# Patient Record
Sex: MALE | Race: WHITE | NOT HISPANIC OR LATINO | Employment: UNEMPLOYED | URBAN - NONMETROPOLITAN AREA
[De-identification: names, ages, dates, MRNs, and addresses within clinical notes are randomized per-mention and may not be internally consistent; named-entity substitution may affect disease eponyms.]

---

## 2019-08-09 ENCOUNTER — TRANSCRIBE ORDERS (OUTPATIENT)
Dept: ADMINISTRATIVE | Facility: HOSPITAL | Age: 29
End: 2019-08-09

## 2019-08-09 ENCOUNTER — HOSPITAL ENCOUNTER (OUTPATIENT)
Dept: ULTRASOUND IMAGING | Facility: HOSPITAL | Age: 29
Discharge: HOME | End: 2019-08-09
Admitting: PHYSICIAN ASSISTANT

## 2019-08-09 DIAGNOSIS — N45.1 EPIDIDYMITIS WITHOUT ABSCESS: Primary | ICD-10-CM

## 2019-08-09 DIAGNOSIS — N45.1 EPIDIDYMITIS WITHOUT ABSCESS: ICD-10-CM

## 2019-08-09 PROCEDURE — 76870 US EXAM SCROTUM: CPT

## 2019-08-09 PROCEDURE — 76870 US EXAM SCROTUM: CPT | Performed by: RADIOLOGY

## 2019-08-19 ENCOUNTER — TRANSCRIBE ORDERS (OUTPATIENT)
Dept: ADMINISTRATIVE | Facility: HOSPITAL | Age: 29
End: 2019-08-19

## 2019-08-19 DIAGNOSIS — N45.1 EPIDIDYMITIS: ICD-10-CM

## 2019-08-19 DIAGNOSIS — N50.819 TESTICULAR PAIN: Primary | ICD-10-CM

## 2019-10-06 ENCOUNTER — HOSPITAL ENCOUNTER (EMERGENCY)
Facility: HOSPITAL | Age: 29
Discharge: HOME | End: 2019-10-06
Attending: EMERGENCY MEDICINE | Admitting: EMERGENCY MEDICINE

## 2019-10-06 VITALS
SYSTOLIC BLOOD PRESSURE: 111 MMHG | DIASTOLIC BLOOD PRESSURE: 68 MMHG | TEMPERATURE: 98.7 F | HEART RATE: 77 BPM | OXYGEN SATURATION: 98 % | WEIGHT: 160 LBS | BODY MASS INDEX: 25.71 KG/M2 | HEIGHT: 66 IN | RESPIRATION RATE: 18 BRPM

## 2019-10-06 DIAGNOSIS — T78.40XA ALLERGIC REACTION, INITIAL ENCOUNTER: Primary | ICD-10-CM

## 2019-10-06 PROCEDURE — 63710000001 DIPHENHYDRAMINE PER 50 MG: Performed by: EMERGENCY MEDICINE

## 2019-10-06 PROCEDURE — 99283 EMERGENCY DEPT VISIT LOW MDM: CPT

## 2019-10-06 RX ORDER — DIPHENHYDRAMINE HCL 50 MG
50 CAPSULE ORAL ONCE
Status: COMPLETED | OUTPATIENT
Start: 2019-10-06 | End: 2019-10-06

## 2019-10-06 RX ORDER — FAMOTIDINE 20 MG/1
20 TABLET, FILM COATED ORAL 2 TIMES DAILY
Qty: 14 TABLET | Refills: 0 | Status: SHIPPED | OUTPATIENT
Start: 2019-10-06

## 2019-10-06 RX ORDER — FAMOTIDINE 20 MG/1
20 TABLET, FILM COATED ORAL ONCE
Status: COMPLETED | OUTPATIENT
Start: 2019-10-06 | End: 2019-10-06

## 2019-10-06 RX ORDER — DIPHENHYDRAMINE HCL 50 MG
50 CAPSULE ORAL EVERY 6 HOURS PRN
Qty: 22 CAPSULE | Refills: 0 | Status: SHIPPED | OUTPATIENT
Start: 2019-10-06 | End: 2022-10-10

## 2019-10-06 RX ADMIN — FAMOTIDINE 20 MG: 20 TABLET, FILM COATED ORAL at 19:33

## 2019-10-06 RX ADMIN — DIPHENHYDRAMINE HCL 50 MG: 50 CAPSULE ORAL at 19:33

## 2019-10-06 NOTE — ED PROVIDER NOTES
Subjective   Patient presents to ER with swelling upper lip        Allergic Reaction   Presenting symptoms: difficulty breathing, itching and swelling    Severity:  Mild  Prior allergic episodes:  No prior episodes  Context comment:  Mold, unknown antibiotic      Review of Systems   Constitutional: Positive for activity change.   HENT: Positive for facial swelling.    Eyes: Negative.    Respiratory: Positive for shortness of breath.    Cardiovascular: Negative.    Gastrointestinal: Negative.    Genitourinary: Negative.    Musculoskeletal: Negative.    Skin: Positive for itching.   Allergic/Immunologic: Negative.    Neurological: Negative.    Hematological: Negative.    Psychiatric/Behavioral: Negative.        No past medical history on file.    No Known Allergies    No past surgical history on file.    No family history on file.    Social History     Socioeconomic History   • Marital status: Unknown     Spouse name: Not on file   • Number of children: Not on file   • Years of education: Not on file   • Highest education level: Not on file           Objective   Physical Exam   Constitutional: He appears well-developed and well-nourished.   HENT:   Swelling upper lip   Eyes: Pupils are equal, round, and reactive to light.   Neck: Normal range of motion.   Cardiovascular: Normal rate.   Pulmonary/Chest: Effort normal.   Abdominal: Soft.   Musculoskeletal: Normal range of motion.   Neurological: He is alert.   Skin: Skin is warm.   Nursing note and vitals reviewed.      Procedures           ED Course                  MDM    Final diagnoses:   Allergic reaction, initial encounter              Yandel Lorenzana MD  10/06/19 1928       Yandel Lorenzana MD  10/06/19 1935

## 2019-10-06 NOTE — ED NOTES
"Pt declined discharge vitals stating \"nah I'm okay.\" Pt RR even and unlabored, skin WPD. NADN.      Crissy Sheth, RN  10/06/19 1934    "

## 2021-05-28 ENCOUNTER — APPOINTMENT (OUTPATIENT)
Dept: GENERAL RADIOLOGY | Facility: HOSPITAL | Age: 31
End: 2021-05-28

## 2021-05-28 ENCOUNTER — HOSPITAL ENCOUNTER (EMERGENCY)
Facility: HOSPITAL | Age: 31
Discharge: HOME | End: 2021-05-28
Attending: EMERGENCY MEDICINE | Admitting: EMERGENCY MEDICINE

## 2021-05-28 ENCOUNTER — APPOINTMENT (OUTPATIENT)
Dept: CT IMAGING | Facility: HOSPITAL | Age: 31
End: 2021-05-28

## 2021-05-28 VITALS
BODY MASS INDEX: 22.5 KG/M2 | DIASTOLIC BLOOD PRESSURE: 67 MMHG | WEIGHT: 140 LBS | SYSTOLIC BLOOD PRESSURE: 110 MMHG | TEMPERATURE: 98.2 F | RESPIRATION RATE: 16 BRPM | OXYGEN SATURATION: 100 % | HEIGHT: 66 IN | HEART RATE: 82 BPM

## 2021-05-28 DIAGNOSIS — F15.10 METHAMPHETAMINE ABUSE (HCC): Primary | ICD-10-CM

## 2021-05-28 DIAGNOSIS — T14.8XXA ABRASION: ICD-10-CM

## 2021-05-28 DIAGNOSIS — M25.561 ACUTE PAIN OF RIGHT KNEE: ICD-10-CM

## 2021-05-28 LAB
6-ACETYL MORPHINE: NEGATIVE
ALBUMIN SERPL-MCNC: 4 G/DL (ref 3.5–5.2)
ALBUMIN/GLOB SERPL: 1.5 G/DL
ALP SERPL-CCNC: 108 U/L (ref 39–117)
ALT SERPL W P-5'-P-CCNC: 247 U/L (ref 1–41)
AMPHET+METHAMPHET UR QL: POSITIVE
ANION GAP SERPL CALCULATED.3IONS-SCNC: 9.7 MMOL/L (ref 5–15)
APAP SERPL-MCNC: <5 MCG/ML (ref 0–30)
AST SERPL-CCNC: 75 U/L (ref 1–40)
BARBITURATES UR QL SCN: NEGATIVE
BASOPHILS # BLD AUTO: 0.06 10*3/MM3 (ref 0–0.2)
BASOPHILS NFR BLD AUTO: 0.6 % (ref 0–1.5)
BENZODIAZ UR QL SCN: NEGATIVE
BILIRUB SERPL-MCNC: 0.8 MG/DL (ref 0–1.2)
BILIRUB UR QL STRIP: NEGATIVE
BUN SERPL-MCNC: 19 MG/DL (ref 6–20)
BUN/CREAT SERPL: 19.4 (ref 7–25)
BUPRENORPHINE SERPL-MCNC: NEGATIVE NG/ML
CALCIUM SPEC-SCNC: 8.7 MG/DL (ref 8.6–10.5)
CANNABINOIDS SERPL QL: NEGATIVE
CHLORIDE SERPL-SCNC: 105 MMOL/L (ref 98–107)
CK SERPL-CCNC: 459 U/L (ref 20–200)
CLARITY UR: CLEAR
CO2 SERPL-SCNC: 25.3 MMOL/L (ref 22–29)
COCAINE UR QL: NEGATIVE
COLOR UR: ABNORMAL
CREAT SERPL-MCNC: 0.98 MG/DL (ref 0.76–1.27)
CRP SERPL-MCNC: 1.06 MG/DL (ref 0–0.5)
D DIMER PPP FEU-MCNC: 0.65 MCGFEU/ML (ref 0–0.5)
D-LACTATE SERPL-SCNC: 0.7 MMOL/L (ref 0.5–2)
DEPRECATED RDW RBC AUTO: 42.7 FL (ref 37–54)
EOSINOPHIL # BLD AUTO: 0.12 10*3/MM3 (ref 0–0.4)
EOSINOPHIL NFR BLD AUTO: 1.2 % (ref 0.3–6.2)
ERYTHROCYTE [DISTWIDTH] IN BLOOD BY AUTOMATED COUNT: 13.1 % (ref 12.3–15.4)
ERYTHROCYTE [SEDIMENTATION RATE] IN BLOOD: 9 MM/HR (ref 0–15)
ETHANOL BLD-MCNC: <10 MG/DL (ref 0–10)
ETHANOL UR QL: <0.01 %
FLUAV RNA RESP QL NAA+PROBE: NOT DETECTED
FLUBV RNA RESP QL NAA+PROBE: NOT DETECTED
GFR SERPL CREATININE-BSD FRML MDRD: 90 ML/MIN/1.73
GLOBULIN UR ELPH-MCNC: 2.7 GM/DL
GLUCOSE SERPL-MCNC: 104 MG/DL (ref 65–99)
GLUCOSE UR STRIP-MCNC: NEGATIVE MG/DL
HCT VFR BLD AUTO: 37.5 % (ref 37.5–51)
HGB BLD-MCNC: 12.4 G/DL (ref 13–17.7)
HGB UR QL STRIP.AUTO: NEGATIVE
IMM GRANULOCYTES # BLD AUTO: 0.03 10*3/MM3 (ref 0–0.05)
IMM GRANULOCYTES NFR BLD AUTO: 0.3 % (ref 0–0.5)
KETONES UR QL STRIP: ABNORMAL
LEUKOCYTE ESTERASE UR QL STRIP.AUTO: NEGATIVE
LYMPHOCYTES # BLD AUTO: 2 10*3/MM3 (ref 0.7–3.1)
LYMPHOCYTES NFR BLD AUTO: 19.3 % (ref 19.6–45.3)
MAGNESIUM SERPL-MCNC: 1.9 MG/DL (ref 1.6–2.6)
MCH RBC QN AUTO: 30.1 PG (ref 26.6–33)
MCHC RBC AUTO-ENTMCNC: 33.1 G/DL (ref 31.5–35.7)
MCV RBC AUTO: 91 FL (ref 79–97)
METHADONE UR QL SCN: NEGATIVE
MONOCYTES # BLD AUTO: 0.84 10*3/MM3 (ref 0.1–0.9)
MONOCYTES NFR BLD AUTO: 8.1 % (ref 5–12)
NEUTROPHILS NFR BLD AUTO: 7.3 10*3/MM3 (ref 1.7–7)
NEUTROPHILS NFR BLD AUTO: 70.5 % (ref 42.7–76)
NITRITE UR QL STRIP: NEGATIVE
NRBC BLD AUTO-RTO: 0 /100 WBC (ref 0–0.2)
OPIATES UR QL: NEGATIVE
OXYCODONE UR QL SCN: NEGATIVE
PCP UR QL SCN: NEGATIVE
PH UR STRIP.AUTO: 5.5 [PH] (ref 5–8)
PLATELET # BLD AUTO: 261 10*3/MM3 (ref 140–450)
PMV BLD AUTO: 9.5 FL (ref 6–12)
POTASSIUM SERPL-SCNC: 3.9 MMOL/L (ref 3.5–5.2)
PROCALCITONIN SERPL-MCNC: 0.16 NG/ML (ref 0–0.25)
PROT SERPL-MCNC: 6.7 G/DL (ref 6–8.5)
PROT UR QL STRIP: NEGATIVE
QT INTERVAL: 358 MS
QTC INTERVAL: 461 MS
RBC # BLD AUTO: 4.12 10*6/MM3 (ref 4.14–5.8)
SALICYLATES SERPL-MCNC: <0.3 MG/DL
SARS-COV-2 RNA RESP QL NAA+PROBE: NOT DETECTED
SODIUM SERPL-SCNC: 140 MMOL/L (ref 136–145)
SP GR UR STRIP: 1.03 (ref 1–1.03)
T4 FREE SERPL-MCNC: 1.05 NG/DL (ref 0.93–1.7)
TROPONIN T SERPL-MCNC: <0.01 NG/ML (ref 0–0.03)
TSH SERPL DL<=0.05 MIU/L-ACNC: 1.27 UIU/ML (ref 0.27–4.2)
UROBILINOGEN UR QL STRIP: ABNORMAL
WBC # BLD AUTO: 10.35 10*3/MM3 (ref 3.4–10.8)

## 2021-05-28 PROCEDURE — 80179 DRUG ASSAY SALICYLATE: CPT | Performed by: EMERGENCY MEDICINE

## 2021-05-28 PROCEDURE — 80307 DRUG TEST PRSMV CHEM ANLYZR: CPT | Performed by: EMERGENCY MEDICINE

## 2021-05-28 PROCEDURE — 71045 X-RAY EXAM CHEST 1 VIEW: CPT

## 2021-05-28 PROCEDURE — 83605 ASSAY OF LACTIC ACID: CPT | Performed by: EMERGENCY MEDICINE

## 2021-05-28 PROCEDURE — 71275 CT ANGIOGRAPHY CHEST: CPT | Performed by: RADIOLOGY

## 2021-05-28 PROCEDURE — 82550 ASSAY OF CK (CPK): CPT | Performed by: EMERGENCY MEDICINE

## 2021-05-28 PROCEDURE — 80053 COMPREHEN METABOLIC PANEL: CPT | Performed by: EMERGENCY MEDICINE

## 2021-05-28 PROCEDURE — 87636 SARSCOV2 & INF A&B AMP PRB: CPT | Performed by: EMERGENCY MEDICINE

## 2021-05-28 PROCEDURE — 73560 X-RAY EXAM OF KNEE 1 OR 2: CPT | Performed by: RADIOLOGY

## 2021-05-28 PROCEDURE — 87040 BLOOD CULTURE FOR BACTERIA: CPT | Performed by: EMERGENCY MEDICINE

## 2021-05-28 PROCEDURE — 0 IOPAMIDOL PER 1 ML: Performed by: EMERGENCY MEDICINE

## 2021-05-28 PROCEDURE — 85652 RBC SED RATE AUTOMATED: CPT | Performed by: EMERGENCY MEDICINE

## 2021-05-28 PROCEDURE — 86140 C-REACTIVE PROTEIN: CPT | Performed by: EMERGENCY MEDICINE

## 2021-05-28 PROCEDURE — 83735 ASSAY OF MAGNESIUM: CPT | Performed by: EMERGENCY MEDICINE

## 2021-05-28 PROCEDURE — 85379 FIBRIN DEGRADATION QUANT: CPT | Performed by: EMERGENCY MEDICINE

## 2021-05-28 PROCEDURE — 93005 ELECTROCARDIOGRAM TRACING: CPT | Performed by: EMERGENCY MEDICINE

## 2021-05-28 PROCEDURE — 84145 PROCALCITONIN (PCT): CPT | Performed by: EMERGENCY MEDICINE

## 2021-05-28 PROCEDURE — 84443 ASSAY THYROID STIM HORMONE: CPT | Performed by: EMERGENCY MEDICINE

## 2021-05-28 PROCEDURE — 81003 URINALYSIS AUTO W/O SCOPE: CPT | Performed by: EMERGENCY MEDICINE

## 2021-05-28 PROCEDURE — 84439 ASSAY OF FREE THYROXINE: CPT | Performed by: EMERGENCY MEDICINE

## 2021-05-28 PROCEDURE — 73560 X-RAY EXAM OF KNEE 1 OR 2: CPT

## 2021-05-28 PROCEDURE — 80143 DRUG ASSAY ACETAMINOPHEN: CPT | Performed by: EMERGENCY MEDICINE

## 2021-05-28 PROCEDURE — 82077 ASSAY SPEC XCP UR&BREATH IA: CPT | Performed by: EMERGENCY MEDICINE

## 2021-05-28 PROCEDURE — 71275 CT ANGIOGRAPHY CHEST: CPT

## 2021-05-28 PROCEDURE — 71045 X-RAY EXAM CHEST 1 VIEW: CPT | Performed by: RADIOLOGY

## 2021-05-28 PROCEDURE — 85025 COMPLETE CBC W/AUTO DIFF WBC: CPT | Performed by: EMERGENCY MEDICINE

## 2021-05-28 PROCEDURE — 84484 ASSAY OF TROPONIN QUANT: CPT | Performed by: EMERGENCY MEDICINE

## 2021-05-28 PROCEDURE — 99284 EMERGENCY DEPT VISIT MOD MDM: CPT

## 2021-05-28 RX ORDER — SODIUM CHLORIDE 0.9 % (FLUSH) 0.9 %
10 SYRINGE (ML) INJECTION AS NEEDED
Status: DISCONTINUED | OUTPATIENT
Start: 2021-05-28 | End: 2021-05-28 | Stop reason: HOSPADM

## 2021-05-28 RX ADMIN — IOPAMIDOL 70 ML: 755 INJECTION, SOLUTION INTRAVENOUS at 07:53

## 2021-05-28 RX ADMIN — SODIUM CHLORIDE 1000 ML: 9 INJECTION, SOLUTION INTRAVENOUS at 07:21

## 2021-06-02 LAB
BACTERIA SPEC AEROBE CULT: NORMAL
BACTERIA SPEC AEROBE CULT: NORMAL

## 2022-09-11 ENCOUNTER — HOSPITAL ENCOUNTER (EMERGENCY)
Facility: HOSPITAL | Age: 32
Discharge: HOME | End: 2022-09-11
Attending: STUDENT IN AN ORGANIZED HEALTH CARE EDUCATION/TRAINING PROGRAM | Admitting: STUDENT IN AN ORGANIZED HEALTH CARE EDUCATION/TRAINING PROGRAM

## 2022-09-11 ENCOUNTER — APPOINTMENT (OUTPATIENT)
Dept: CT IMAGING | Facility: HOSPITAL | Age: 32
End: 2022-09-11

## 2022-09-11 VITALS
HEART RATE: 60 BPM | RESPIRATION RATE: 16 BRPM | TEMPERATURE: 98.6 F | HEIGHT: 66 IN | BODY MASS INDEX: 22.5 KG/M2 | WEIGHT: 140 LBS | SYSTOLIC BLOOD PRESSURE: 115 MMHG | OXYGEN SATURATION: 99 % | DIASTOLIC BLOOD PRESSURE: 80 MMHG

## 2022-09-11 DIAGNOSIS — N30.00 ACUTE CYSTITIS WITHOUT HEMATURIA: Primary | ICD-10-CM

## 2022-09-11 DIAGNOSIS — N41.0 ACUTE PROSTATITIS: ICD-10-CM

## 2022-09-11 DIAGNOSIS — N20.0 NEPHROLITHIASIS: ICD-10-CM

## 2022-09-11 DIAGNOSIS — N21.0 STONE, BLADDER: ICD-10-CM

## 2022-09-11 DIAGNOSIS — N48.89 PENILE SWELLING: ICD-10-CM

## 2022-09-11 LAB
ALBUMIN SERPL-MCNC: 4.02 G/DL (ref 3.5–5.2)
ALBUMIN/GLOB SERPL: 1.6 G/DL
ALP SERPL-CCNC: 70 U/L (ref 39–117)
ALT SERPL W P-5'-P-CCNC: 73 U/L (ref 1–41)
ANION GAP SERPL CALCULATED.3IONS-SCNC: 8.2 MMOL/L (ref 5–15)
AST SERPL-CCNC: 47 U/L (ref 1–40)
BACTERIA UR QL AUTO: ABNORMAL /HPF
BASOPHILS # BLD AUTO: 0.04 10*3/MM3 (ref 0–0.2)
BASOPHILS NFR BLD AUTO: 1 % (ref 0–1.5)
BILIRUB SERPL-MCNC: 0.2 MG/DL (ref 0–1.2)
BILIRUB UR QL STRIP: NEGATIVE
BUN SERPL-MCNC: 18 MG/DL (ref 6–20)
BUN/CREAT SERPL: 22.2 (ref 7–25)
C TRACH RRNA CVX QL NAA+PROBE: NOT DETECTED
CALCIUM SPEC-SCNC: 9.1 MG/DL (ref 8.6–10.5)
CHLORIDE SERPL-SCNC: 106 MMOL/L (ref 98–107)
CLARITY UR: ABNORMAL
CO2 SERPL-SCNC: 27.8 MMOL/L (ref 22–29)
COLOR UR: YELLOW
CREAT SERPL-MCNC: 0.81 MG/DL (ref 0.76–1.27)
CRP SERPL-MCNC: 8.13 MG/DL (ref 0–0.5)
DEPRECATED RDW RBC AUTO: 42.9 FL (ref 37–54)
EGFRCR SERPLBLD CKD-EPI 2021: 120.9 ML/MIN/1.73
EOSINOPHIL # BLD AUTO: 0.35 10*3/MM3 (ref 0–0.4)
EOSINOPHIL NFR BLD AUTO: 9 % (ref 0.3–6.2)
ERYTHROCYTE [DISTWIDTH] IN BLOOD BY AUTOMATED COUNT: 12.8 % (ref 12.3–15.4)
FLUAV RNA RESP QL NAA+PROBE: NOT DETECTED
FLUBV RNA RESP QL NAA+PROBE: NOT DETECTED
GLOBULIN UR ELPH-MCNC: 2.6 GM/DL
GLUCOSE SERPL-MCNC: 117 MG/DL (ref 65–99)
GLUCOSE UR STRIP-MCNC: NEGATIVE MG/DL
HCT VFR BLD AUTO: 40.3 % (ref 37.5–51)
HGB BLD-MCNC: 13.6 G/DL (ref 13–17.7)
HGB UR QL STRIP.AUTO: NEGATIVE
HYALINE CASTS UR QL AUTO: ABNORMAL /LPF
IMM GRANULOCYTES # BLD AUTO: 0.01 10*3/MM3 (ref 0–0.05)
IMM GRANULOCYTES NFR BLD AUTO: 0.3 % (ref 0–0.5)
KETONES UR QL STRIP: NEGATIVE
LEUKOCYTE ESTERASE UR QL STRIP.AUTO: ABNORMAL
LYMPHOCYTES # BLD AUTO: 1.91 10*3/MM3 (ref 0.7–3.1)
LYMPHOCYTES NFR BLD AUTO: 49 % (ref 19.6–45.3)
MCH RBC QN AUTO: 30.9 PG (ref 26.6–33)
MCHC RBC AUTO-ENTMCNC: 33.7 G/DL (ref 31.5–35.7)
MCV RBC AUTO: 91.6 FL (ref 79–97)
MONOCYTES # BLD AUTO: 0.33 10*3/MM3 (ref 0.1–0.9)
MONOCYTES NFR BLD AUTO: 8.5 % (ref 5–12)
N GONORRHOEA RRNA SPEC QL NAA+PROBE: NOT DETECTED
NEUTROPHILS NFR BLD AUTO: 1.26 10*3/MM3 (ref 1.7–7)
NEUTROPHILS NFR BLD AUTO: 32.2 % (ref 42.7–76)
NITRITE UR QL STRIP: NEGATIVE
NRBC BLD AUTO-RTO: 0 /100 WBC (ref 0–0.2)
PH UR STRIP.AUTO: 6 [PH] (ref 5–8)
PLAT MORPH BLD: NORMAL
PLATELET # BLD AUTO: 135 10*3/MM3 (ref 140–450)
PMV BLD AUTO: 10.3 FL (ref 6–12)
POTASSIUM SERPL-SCNC: 4.7 MMOL/L (ref 3.5–5.2)
PROT SERPL-MCNC: 6.6 G/DL (ref 6–8.5)
PROT UR QL STRIP: NEGATIVE
RBC # BLD AUTO: 4.4 10*6/MM3 (ref 4.14–5.8)
RBC # UR STRIP: ABNORMAL /HPF
RBC MORPH BLD: NORMAL
REF LAB TEST METHOD: ABNORMAL
SARS-COV-2 RNA RESP QL NAA+PROBE: NOT DETECTED
SODIUM SERPL-SCNC: 142 MMOL/L (ref 136–145)
SP GR UR STRIP: 1.02 (ref 1–1.03)
SQUAMOUS #/AREA URNS HPF: ABNORMAL /HPF
UROBILINOGEN UR QL STRIP: ABNORMAL
WBC # UR STRIP: ABNORMAL /HPF
WBC NRBC COR # BLD: 3.9 10*3/MM3 (ref 3.4–10.8)

## 2022-09-11 PROCEDURE — 81001 URINALYSIS AUTO W/SCOPE: CPT | Performed by: STUDENT IN AN ORGANIZED HEALTH CARE EDUCATION/TRAINING PROGRAM

## 2022-09-11 PROCEDURE — 74176 CT ABD & PELVIS W/O CONTRAST: CPT

## 2022-09-11 PROCEDURE — 25010000002 CEFTRIAXONE PER 250 MG: Performed by: STUDENT IN AN ORGANIZED HEALTH CARE EDUCATION/TRAINING PROGRAM

## 2022-09-11 PROCEDURE — 96365 THER/PROPH/DIAG IV INF INIT: CPT

## 2022-09-11 PROCEDURE — 87186 SC STD MICRODIL/AGAR DIL: CPT | Performed by: STUDENT IN AN ORGANIZED HEALTH CARE EDUCATION/TRAINING PROGRAM

## 2022-09-11 PROCEDURE — 99284 EMERGENCY DEPT VISIT MOD MDM: CPT

## 2022-09-11 PROCEDURE — 87086 URINE CULTURE/COLONY COUNT: CPT | Performed by: STUDENT IN AN ORGANIZED HEALTH CARE EDUCATION/TRAINING PROGRAM

## 2022-09-11 PROCEDURE — 87491 CHLMYD TRACH DNA AMP PROBE: CPT | Performed by: STUDENT IN AN ORGANIZED HEALTH CARE EDUCATION/TRAINING PROGRAM

## 2022-09-11 PROCEDURE — 86140 C-REACTIVE PROTEIN: CPT | Performed by: STUDENT IN AN ORGANIZED HEALTH CARE EDUCATION/TRAINING PROGRAM

## 2022-09-11 PROCEDURE — 87591 N.GONORRHOEAE DNA AMP PROB: CPT | Performed by: STUDENT IN AN ORGANIZED HEALTH CARE EDUCATION/TRAINING PROGRAM

## 2022-09-11 PROCEDURE — 87636 SARSCOV2 & INF A&B AMP PRB: CPT | Performed by: STUDENT IN AN ORGANIZED HEALTH CARE EDUCATION/TRAINING PROGRAM

## 2022-09-11 PROCEDURE — 80053 COMPREHEN METABOLIC PANEL: CPT | Performed by: STUDENT IN AN ORGANIZED HEALTH CARE EDUCATION/TRAINING PROGRAM

## 2022-09-11 PROCEDURE — 85025 COMPLETE CBC W/AUTO DIFF WBC: CPT | Performed by: STUDENT IN AN ORGANIZED HEALTH CARE EDUCATION/TRAINING PROGRAM

## 2022-09-11 PROCEDURE — 85007 BL SMEAR W/DIFF WBC COUNT: CPT | Performed by: STUDENT IN AN ORGANIZED HEALTH CARE EDUCATION/TRAINING PROGRAM

## 2022-09-11 RX ORDER — HYDROCODONE BITARTRATE AND ACETAMINOPHEN 5; 325 MG/1; MG/1
1 TABLET ORAL EVERY 6 HOURS PRN
Status: DISCONTINUED | OUTPATIENT
Start: 2022-09-11 | End: 2022-09-11 | Stop reason: HOSPADM

## 2022-09-11 RX ORDER — HYDROCODONE BITARTRATE AND ACETAMINOPHEN 5; 325 MG/1; MG/1
1 TABLET ORAL EVERY 6 HOURS PRN
Qty: 5 TABLET | Refills: 0 | Status: SHIPPED | OUTPATIENT
Start: 2022-09-11 | End: 2022-09-16

## 2022-09-11 RX ORDER — SULFAMETHOXAZOLE AND TRIMETHOPRIM 800; 160 MG/1; MG/1
1 TABLET ORAL 2 TIMES DAILY
Qty: 60 TABLET | Refills: 0 | Status: SHIPPED | OUTPATIENT
Start: 2022-09-11 | End: 2022-10-12 | Stop reason: HOSPADM

## 2022-09-11 RX ADMIN — SODIUM CHLORIDE 1000 ML: 9 INJECTION, SOLUTION INTRAVENOUS at 14:58

## 2022-09-11 RX ADMIN — CEFTRIAXONE 1 G: 1 INJECTION, POWDER, FOR SOLUTION INTRAMUSCULAR; INTRAVENOUS at 17:29

## 2022-09-11 RX ADMIN — HYDROCODONE BITARTRATE AND ACETAMINOPHEN 1 TABLET: 5; 325 TABLET ORAL at 19:17

## 2022-09-11 NOTE — DISCHARGE INSTRUCTIONS
Please follow-up with urologist Dr. Colunga soon as possible.  Do not hesitate to return here for new onset or worsening symptoms.

## 2022-09-11 NOTE — ED PROVIDER NOTES
"  [facility]  emergency department encounter        Pt Name: Phillip Matta  MRN: [id number]  Birthdate 1990  Date of evaluation: 9/11/2022    Chief Complaint   Patient presents with   • Groin Swelling   • Penis Pain             HISTORY OF PRESENT ILLNESS:   Phillip Matta is a 32 y.o. male PMH quadriplegia, prior self catheterizations with recurrent UTIs, has not catheterized in 4 months after starting an unspecified medication to improve urination.    Patient arrives by EMS from home with predominant complaint of swelling to his penis with mild pain in his perineum.  Also noted infection from his penis though he denies that it is puslike and is unable to describe it other than it being \"infection\".  Denies dysuria/urinary burning.  Patient's initial symptoms onset 2 days ago with pain to his left flank that then radiated mildly down the side into his lower left abdomen.  He passed a large stone yesterday.  Denies prior history of nephrolithiasis and does not follow with a urologist.  Endorses subjective fever, dizziness, nausea without vomiting.    Patient endorses sexual activity.    Declines need for pain or nausea control at this time.    No other exacerbating or alleviating factors other than as noted above.  Severity: Severe    PCP: Bing Strong APRN          REVIEW OF SYSTEMS:     Review of Systems   Constitutional: Positive for fever.   HENT: Positive for rhinorrhea. Negative for congestion.    Eyes: Negative for visual disturbance.   Respiratory: Negative for cough and shortness of breath.    Cardiovascular: Negative for chest pain.   Gastrointestinal: Positive for nausea. Negative for abdominal pain and vomiting.   Genitourinary: Positive for flank pain (Resolved prior to arrival), penile discharge, penile pain and penile swelling. Negative for dysuria.        Perineal pain   Musculoskeletal: Negative for myalgias.   Skin: Negative for rash.   Neurological: Positive for dizziness. " Negative for headaches.   Psychiatric/Behavioral: Negative for confusion.       Review of systems otherwise as per HPI.          PREVIOUS HISTORY:       No past medical history on file.      No past surgical history on file.        Social History     Socioeconomic History   • Marital status: Unknown         No family history on file.      Current Outpatient Medications   Medication Instructions   • diphenhydrAMINE (BENADRYL) 50 mg, Oral, Every 6 Hours PRN   • famotidine (PEPCID) 20 mg, Oral, 2 Times Daily   • HYDROcodone-acetaminophen (NORCO) 5-325 MG per tablet 1 tablet, Oral, Every 6 Hours PRN   • sulfamethoxazole-trimethoprim (BACTRIM DS,SEPTRA DS) 800-160 MG per tablet 1 tablet, Oral, 2 Times Daily         Allergies:  Patient has no known allergies.    Medications, allergies and past medical, surgical, family, and social history reviewed.        PHYSICAL EXAM:     Physical Exam  Vitals and nursing note reviewed.   Constitutional:       General: He is not in acute distress.     Appearance: Normal appearance.   HENT:      Head: Normocephalic and atraumatic.   Eyes:      Extraocular Movements: Extraocular movements intact.      Conjunctiva/sclera: Conjunctivae normal.   Cardiovascular:      Rate and Rhythm: Normal rate and regular rhythm.   Pulmonary:      Effort: Pulmonary effort is normal. No respiratory distress.   Abdominal:      General: Abdomen is flat. There is no distension.      Tenderness: There is no abdominal tenderness. There is no guarding or rebound.      Comments: Abdomen mildly firm   Genitourinary:     Comments: Mildly edematous glans and shaft.  No discharge expressed.  No surrounding erythema or lesions.  Patient endorses area of pain in his perineum when palpated but no elicit tenderness.  Again no erythema or lesions noted.  Musculoskeletal:         General: No deformity. Normal range of motion.      Cervical back: Normal range of motion. No rigidity.      Right lower leg: No edema.      Left  lower leg: No edema.   Neurological:      General: No focal deficit present.      Mental Status: He is alert and oriented to person, place, and time.      Motor: Weakness (Able to move all limbs.  Nonfocal.) present.   Psychiatric:         Mood and Affect: Mood normal.         Behavior: Behavior normal.             COMPLETED DIAGNOSTIC STUDIES AND INTERVENTIONS:     Lab Results (last 24 hours)     Procedure Component Value Units Date/Time    CBC & Differential [[id number]]  (Abnormal) Collected: 09/11/22 1458    Specimen: Blood Updated: 09/11/22 1548    Narrative:      The following orders were created for panel order CBC & Differential.  Procedure                               Abnormality         Status                     ---------                               -----------         ------                     CBC Auto Differential[[id number]]        Abnormal            Final result               Scan Slide[[id number]]                   Normal              Final result                 Please view results for these tests on the individual orders.    Comprehensive Metabolic Panel [[id number]]  (Abnormal) Collected: 09/11/22 1458    Specimen: Blood Updated: 09/11/22 1523     Glucose 117 mg/dL      BUN 18 mg/dL      Creatinine 0.81 mg/dL      Sodium 142 mmol/L      Potassium 4.7 mmol/L      Comment: Slight hemolysis detected by analyzer. Results may be affected.        Chloride 106 mmol/L      CO2 27.8 mmol/L      Calcium 9.1 mg/dL      Total Protein 6.6 g/dL      Albumin 4.02 g/dL      ALT (SGPT) 73 U/L      AST (SGOT) 47 U/L      Alkaline Phosphatase 70 U/L      Total Bilirubin 0.2 mg/dL      Globulin 2.6 gm/dL      A/G Ratio 1.6 g/dL      BUN/Creatinine Ratio 22.2     Anion Gap 8.2 mmol/L      eGFR 120.9 mL/min/1.73      Comment: National Kidney Foundation and American Society of Nephrology (ASN) Task Force recommended calculation based on the Chronic Kidney Disease Epidemiology Collaboration (CKD-EPI) equation refit  without adjustment for race.       Narrative:      GFR Normal >60  Chronic Kidney Disease <60  Kidney Failure <15      C-reactive Protein [[id number]]  (Abnormal) Collected: 09/11/22 1458    Specimen: Blood Updated: 09/11/22 1523     C-Reactive Protein 8.13 mg/dL     CBC Auto Differential [[id number]]  (Abnormal) Collected: 09/11/22 1458    Specimen: Blood Updated: 09/11/22 1508     WBC 3.90 10*3/mm3      RBC 4.40 10*6/mm3      Hemoglobin 13.6 g/dL      Hematocrit 40.3 %      MCV 91.6 fL      MCH 30.9 pg      MCHC 33.7 g/dL      RDW 12.8 %      RDW-SD 42.9 fl      MPV 10.3 fL      Platelets 135 10*3/mm3      Neutrophil % 32.2 %      Lymphocyte % 49.0 %      Monocyte % 8.5 %      Eosinophil % 9.0 %      Basophil % 1.0 %      Immature Grans % 0.3 %      Neutrophils, Absolute 1.26 10*3/mm3      Lymphocytes, Absolute 1.91 10*3/mm3      Monocytes, Absolute 0.33 10*3/mm3      Eosinophils, Absolute 0.35 10*3/mm3      Basophils, Absolute 0.04 10*3/mm3      Immature Grans, Absolute 0.01 10*3/mm3      nRBC 0.0 /100 WBC     Scan Slide [[id number]]  (Normal) Collected: 09/11/22 1458    Specimen: Blood Updated: 09/11/22 1548     RBC Morphology Normal     Platelet Morphology Normal    Urinalysis With Culture If Indicated - Urine, Clean Catch [[id number]]  (Abnormal) Collected: 09/11/22 1550    Specimen: Urine, Clean Catch Updated: 09/11/22 1604     Color, UA Yellow     Appearance, UA Cloudy     pH, UA 6.0     Specific Gravity, UA 1.021     Glucose, UA Negative     Ketones, UA Negative     Bilirubin, UA Negative     Blood, UA Negative     Protein, UA Negative     Leuk Esterase, UA Small (1+)     Nitrite, UA Negative     Urobilinogen, UA 0.2 E.U./dL    Narrative:      In absence of clinical symptoms, the presence of pyuria, bacteria, and/or nitrites on the urinalysis result does not correlate with infection.    Chlamydia trachomatis, Neisseria gonorrhoeae, PCR - Urine, Urine, Clean Catch [[id number]]  (Normal) Collected: 09/11/22 8658     Specimen: Urine, Clean Catch Updated: 09/11/22 1734     Chlamydia DNA by PCR Not Detected     Neisseria gonorrhoeae by PCR Not Detected    Narrative:      PCR testing performed using target DNA sequences.    Urinalysis, Microscopic Only - Urine, Clean Catch [[id number]]  (Abnormal) Collected: 09/11/22 1550    Specimen: Urine, Clean Catch Updated: 09/11/22 1604     RBC, UA 0-2 /HPF      WBC, UA 6-12 /HPF      Bacteria, UA 4+ /HPF      Squamous Epithelial Cells, UA 0-2 /HPF      Hyaline Casts, UA None Seen /LPF      Methodology Automated Microscopy    Urine Culture - Urine, Urine, Clean Catch [[id number]] Collected: 09/11/22 1550    Specimen: Urine, Clean Catch Updated: 09/11/22 1604    COVID PRE-OP / PRE-PROCEDURE SCREENING ORDER (NO ISOLATION) - Swab, Nasopharynx [[id number]]  (Normal) Collected: 09/11/22 1630    Specimen: Swab from Nasopharynx Updated: 09/11/22 1717    Narrative:      The following orders were created for panel order COVID PRE-OP / PRE-PROCEDURE SCREENING ORDER (NO ISOLATION) - Swab, Nasopharynx.  Procedure                               Abnormality         Status                     ---------                               -----------         ------                     COVID-19 and FLU A/B PCR...[[id number]]  Normal              Final result                 Please view results for these tests on the individual orders.    COVID-19 and FLU A/B PCR - Swab, Nasopharynx [[id number]]  (Normal) Collected: 09/11/22 1630    Specimen: Swab from Nasopharynx Updated: 09/11/22 1717     COVID19 Not Detected     Influenza A PCR Not Detected     Influenza B PCR Not Detected    Narrative:      Fact sheet for providers: https://www.fda.gov/media/682226/download    Fact sheet for patients: https://www.fda.gov/media/134948/download    Test performed by PCR.            CT Abdomen Pelvis Stone Protocol   Final Result      1.  The urinary bladder is severely distended and there are a few layering bladder calculi present. No  renal or ureteral calculus. No hydronephrosis.   2.  Large stool burden.   3.  No discrete process identified otherwise.            Signer Name: JESS MELGAR MD    Signed: 9/11/2022 2:53 PM    Workstation Name: [id number]     [facility]            New Medications Ordered This Visit   Medications   • sodium chloride 0.9 % bolus 1,000 mL   • cefTRIAXone (ROCEPHIN) 1 g/100 mL 0.9% NS (MBP)   • sulfamethoxazole-trimethoprim (BACTRIM DS,SEPTRA DS) 800-160 MG per tablet     Sig: Take 1 tablet by mouth 2 (Two) Times a Day for 30 days.     Dispense:  60 tablet     Refill:  0   • HYDROcodone-acetaminophen (NORCO) 5-325 MG per tablet     Sig: Take 1 tablet by mouth Every 6 (Six) Hours As Needed for Severe Pain for up to 5 days.     Dispense:  5 tablet     Refill:  0   • HYDROcodone-acetaminophen (NORCO) 5-325 MG per tablet 1 tablet     TO GO         Procedures            MEDICAL DECISION-MAKING AND ED COURSE:     ED Course as of 09/11/22 1752   Sun Sep 11, 2022   1449 MDM: 32-year-old male with quadriparesis presents with penile swelling, pain in the perineum, abnormal penile discharge, and passed kidney stone yesterday.  Additionally endorses subjective fever, nausea.  Denies dysuria.  Will assess for gonorrhea chlamydia, urinalysis, CT abdomen pelvis for potential persistent nephrolithiasis/ureterolithiasis, constipation, urinary retention, cystitis, or other cause of symptoms. [KP]   1458 CT Abdomen Pelvis Stone Protocol  1.  The urinary bladder is severely distended and there are a few layering bladder calculi present. No renal or ureteral calculus. No hydronephrosis.  2.  Large stool burden.  3.  No discrete process identified otherwise. [KP]   1522 WBC: 3.90 [KP]   1522 Hemoglobin: 13.6 [KP]   1548 C-Reactive Protein(!): 8.13 [KP]   1548 Creatinine: 0.81 [KP]   1705 WBC, UA(!): 6-12 [KP]   1705 Bacteria, UA(!): 4+ [KP]   1721 COVID19: Not Detected [KP]   1747 Chlamydia DNA by PCR: Not Detected  [KP]   1747 Neisseria gonorrhoeae by PCR: Not Detected [KP]   1747 Patient treated for UTI with ceftriaxone 2 g.  Concern for prostatitis, will discharge with Bactrim twice daily for 4 weeks.  Patient to follow-up with urology Dr. Colunga.  Patient to resume self-catheterization.  Discussed return precautions.  Patient is agreeable to plan, all questions answered. [KP]      ED Course User Index  [KP] Gerald Singh MD       ?      FINAL IMPRESSION:       1. Acute cystitis without hematuria    2. Stone, bladder    3. Nephrolithiasis    4. Penile swelling    5. Acute prostatitis         The complaints listed here are new problems to this examiner.      FOLLOW-UP  Bing Strong, APRN  [address] [facility] [address]  [facility] [id number] [phone]    Schedule an appointment as soon as possible for a visit       Demian Colunga MD  [address] [facility]  [address]  [facility] [id number] [phone]    Schedule an appointment as soon as possible for a visit       [facility]  [address] [place] [id number] [phone]    If symptoms worsen      DISPOSITION  ED Disposition     ED Disposition   Discharge    Condition   Stable    Comment   --                   This care is provided during an unprecedented national emergency due to the Novel Coronavirus (COVID-19). COVID-19 infections and transmission risks place heavy strains on healthcare resources. As this pandemic evolves, the Hospital and providers strive to respond fluidly, to remain operational, and to provide care relative to available resources and information. Outcomes are unpredictable and treatments are without well-defined guidelines. Further, the impact of COVID-19 on all aspects of emergency care, including the impact to patients seeking care for reasons other than COVID-19, is unavoidable during this national emergency.    This note was dictated using a [id number] tool. Occasional wrong-word or 'sound-a-like'  substitutions may have occurred due to the inherent limitations of voice recognition software. ?Read the chart carefully and recognize, using context, where substitutions have occurred.    Gerald Singh MD  17:52 EDT  9/11/2022             Gerald Singh MD  09/11/22 5503

## 2022-09-13 LAB — BACTERIA SPEC AEROBE CULT: ABNORMAL

## 2022-09-14 ENCOUNTER — OFFICE VISIT (OUTPATIENT)
Dept: UROLOGY | Facility: CLINIC | Age: 32
End: 2022-09-14

## 2022-09-14 VITALS — WEIGHT: 140 LBS | HEIGHT: 66 IN | BODY MASS INDEX: 22.5 KG/M2

## 2022-09-14 DIAGNOSIS — R35.0 FREQUENCY OF URINATION: Primary | ICD-10-CM

## 2022-09-14 DIAGNOSIS — N21.0 BLADDER STONE: ICD-10-CM

## 2022-09-14 PROCEDURE — 99203 OFFICE O/P NEW LOW 30 MIN: CPT

## 2022-09-14 RX ORDER — GENTAMICIN SULFATE 80 MG/100ML
80 INJECTION, SOLUTION INTRAVENOUS ONCE
Status: CANCELLED | OUTPATIENT
Start: 2022-10-12 | End: 2022-09-14

## 2022-09-14 RX ORDER — METHOCARBAMOL 500 MG/1
500 TABLET, FILM COATED ORAL 3 TIMES DAILY
COMMUNITY
Start: 2022-05-13

## 2022-09-14 RX ORDER — ERGOCALCIFEROL 1.25 MG/1
50000 CAPSULE ORAL WEEKLY
COMMUNITY
Start: 2022-05-19 | End: 2023-05-19

## 2022-09-14 RX ORDER — OXYBUTYNIN CHLORIDE 5 MG/1
TABLET ORAL
COMMUNITY
Start: 2022-08-22 | End: 2022-10-12 | Stop reason: HOSPADM

## 2022-09-14 RX ORDER — IBUPROFEN 600 MG/1
600 TABLET ORAL EVERY 8 HOURS PRN
COMMUNITY
Start: 2022-08-22

## 2022-09-14 RX ORDER — POLYETHYLENE GLYCOL 3350 17 G/17G
POWDER, FOR SOLUTION ORAL
COMMUNITY
Start: 2022-08-22 | End: 2022-10-10

## 2022-09-14 RX ORDER — AMMONIUM LACTATE 12 G/100G
CREAM TOPICAL
COMMUNITY
Start: 2022-08-22

## 2022-09-14 RX ORDER — GABAPENTIN 800 MG/1
800 TABLET ORAL 3 TIMES DAILY
COMMUNITY
Start: 2022-08-18

## 2022-09-14 RX ORDER — MELATONIN 5 MG
10 TABLET ORAL NIGHTLY
COMMUNITY
Start: 2022-08-22

## 2022-09-14 RX ORDER — BISACODYL 10 MG
SUPPOSITORY, RECTAL RECTAL
COMMUNITY
Start: 2022-08-22 | End: 2022-10-10

## 2022-09-14 RX ORDER — LANOLIN ALCOHOL/MO/W.PET/CERES
CREAM (GRAM) TOPICAL
COMMUNITY
Start: 2022-08-22 | End: 2022-10-12 | Stop reason: HOSPADM

## 2022-09-14 RX ORDER — LIDOCAINE 50 MG/G
2 PATCH TOPICAL
COMMUNITY
Start: 2022-05-14

## 2022-09-14 NOTE — PROGRESS NOTES
"Chief Complaint:    Chief Complaint   Patient presents with   • Flank Pain       Vital Signs:   Ht 167.6 cm (65.98\")   Wt 63.5 kg (140 lb)   BMI 22.61 kg/m²   Body mass index is 22.61 kg/m².      HPI:  Phillip Matta is a 32 y.o. male who presents today for initial evaluation     History of Present Illness  Mr. Matta presents to the clinic for nephrolithiasis.  Patient has a medical history significant for cervical neck injury leading to paraplegia.  Patient states that he has had to self cath in the past but has not done so in quite some time.  CT scan was completed on 9/11/2022 that revealed distention of the bladder with multiple calculi present.  There was no ureteral/renal stones or signs of hydronephrosis.  Currently reports frequency, urgency, hematuria, and bilateral flank pain.  He denies any issues urinating or current dysuria.  Bladder scan completed today shows roughly 500 mL. Patient must self cath 4 times daily to help with urinary retention due to neurogenic bladder.      Past Medical History:  No past medical history on file.    Current Meds:  Current Outpatient Medications   Medication Sig Dispense Refill   • ammonium lactate (AMLACTIN) 12 % cream      • bisacodyl (DULCOLAX) 10 MG suppository      • ClearLax 17 GM/SCOOP powder      • Diclofenac Sodium (VOLTAREN) 1 % gel gel      • diphenhydrAMINE (BENADRYL) 50 MG capsule Take 1 capsule by mouth Every 6 (Six) Hours As Needed for Itching. 22 capsule 0   • ergocalciferol (ERGOCALCIFEROL) 1.25 MG (74900 UT) capsule Take 50,000 Units by mouth.     • famotidine (PEPCID) 20 MG tablet Take 1 tablet by mouth 2 (Two) Times a Day. 14 tablet 0   • gabapentin (NEURONTIN) 800 MG tablet      • HYDROcodone-acetaminophen (NORCO) 5-325 MG per tablet Take 1 tablet by mouth Every 6 (Six) Hours As Needed for Severe Pain for up to 5 days. 5 tablet 0   • ibuprofen (ADVIL,MOTRIN) 600 MG tablet      • lidocaine (LIDODERM) 5 % Apply 2 patches topically to the appropriate " area as directed.     • Magnesium Oxide 400 (240 Mg) MG tablet      • Melatonin Maximum Strength 5 MG tablet tablet      • methocarbamol (ROBAXIN) 500 MG tablet Take 500 mg by mouth 3 (Three) Times a Day.     • oxybutynin (DITROPAN) 5 MG tablet      • sulfamethoxazole-trimethoprim (BACTRIM DS,SEPTRA DS) 800-160 MG per tablet Take 1 tablet by mouth 2 (Two) Times a Day for 30 days. 60 tablet 0     No current facility-administered medications for this visit.        Allergies:   No Known Allergies     Past Surgical History:  No past surgical history on file.    Social History:  Social History     Socioeconomic History   • Marital status: Single   Tobacco Use   • Smoking status: Current Every Day Smoker     Packs/day: 0.50     Years: 5.00     Pack years: 2.50     Start date: 2015   • Smokeless tobacco: Never Used   Vaping Use   • Vaping Use: Never used   Substance and Sexual Activity   • Alcohol use: Yes   • Drug use: Never   • Sexual activity: Defer       Family History:  No family history on file.    Review of Systems:  Review of Systems   Constitutional: Negative for fatigue, fever and unexpected weight change.   Respiratory: Negative for chest tightness and shortness of breath.    Cardiovascular: Negative for chest pain.   Gastrointestinal: Positive for abdominal pain and nausea. Negative for constipation, diarrhea and vomiting.   Genitourinary: Positive for flank pain, frequency, hematuria and urgency. Negative for difficulty urinating and dysuria.   Musculoskeletal: Positive for back pain.   Skin: Negative for rash.   Psychiatric/Behavioral: Negative for confusion and suicidal ideas.       Physical Exam:  Physical Exam  Constitutional:       General: He is not in acute distress.     Comments: Wheelchair-bound due to paraplegic   HENT:      Head: Normocephalic and atraumatic.      Nose: Nose normal.      Mouth/Throat:      Mouth: Mucous membranes are moist.   Eyes:      Conjunctiva/sclera: Conjunctivae normal.    Cardiovascular:      Rate and Rhythm: Normal rate and regular rhythm.      Pulses: Normal pulses.      Heart sounds: Normal heart sounds.   Pulmonary:      Effort: Pulmonary effort is normal.      Breath sounds: Normal breath sounds.   Abdominal:      General: Bowel sounds are normal.      Palpations: Abdomen is soft.   Musculoskeletal:         General: Deformity present.      Cervical back: Normal range of motion.   Skin:     General: Skin is warm.   Neurological:      Mental Status: He is alert and oriented to person, place, and time.      Sensory: Sensory deficit present.      Motor: Weakness present.   Psychiatric:         Mood and Affect: Mood normal.         Behavior: Behavior normal.         Thought Content: Thought content normal.         Judgment: Judgment normal.         Recent Image (CT and/or KUB):   CT Abdomen and Pelvis: No results found for this or any previous visit.     CT Stone Protocol: Results for orders placed during the hospital encounter of 09/11/22    CT Abdomen Pelvis Stone Protocol    Narrative  CT Abdomen Pelvis WO    INDICATION:  Nephrolithiasis    TECHNIQUE:  CT of the abdomen and pelvis without IV contrast. Coronal and sagittal reconstructions were obtained.  Radiation dose reduction techniques included automated exposure control or exposure modulation based on body size. Radiation audit for CT and nuclear  cardiology exams in the last 12 months: 0.    COMPARISON:  None available.    FINDINGS:    The patient's arms are within the gantry. This causes significant artifact which does compromise evaluation. Allowing for this:    The visualized lung bases are clear. No destructive osseous lesion.    Evaluation of the solid viscera is compromised by lack of IV contrast. Allowing for this:    Normal noncontrast appearance of the liver, gallbladder, pancreas, spleen and both adrenal glands.    The kidneys are symmetric in size. Evaluation for solid renal lesion is largely precluded by lack of  IV contrast. No hydronephrosis. No renal or ureteral calculus.    The urinary bladder is severely distended and there are a few layering bladder calculi present.    No evidence of bowel obstruction. No localizing perienteric inflammatory change. Large stool burden.    Normal caliber of the abdominal aorta. No lymphadenopathy within the abdomen or pelvis by size criteria.    Impression  1.  The urinary bladder is severely distended and there are a few layering bladder calculi present. No renal or ureteral calculus. No hydronephrosis.  2.  Large stool burden.  3.  No discrete process identified otherwise.        Signer Name: JESS MELGAR MD  Signed: 9/11/2022 2:53 PM  Workstation [facility]: [facility]  [facility]     KUB: No results found for this or any previous visit.       Labs:  Brief Urine Lab Results  (Last result in the past 365 days)      Color   Clarity   Blood   Leuk Est   Nitrite   Protein   CREAT   Urine HCG        09/11/22 1550 Yellow   Cloudy   Negative   Small (1+)   Negative   Negative               Admission on 09/11/2022, Discharged on 09/11/2022   Component Date Value Ref Range Status   • Glucose 09/11/2022 117 (A) 65 - 99 mg/dL Final   • BUN 09/11/2022 18  6 - 20 mg/dL Final   • Creatinine 09/11/2022 0.81  0.76 - 1.27 mg/dL Final   • Sodium 09/11/2022 142  136 - 145 mmol/L Final   • Potassium 09/11/2022 4.7  3.5 - 5.2 mmol/L Final    Slight hemolysis detected by analyzer. Results may be affected.   • Chloride 09/11/2022 106  98 - 107 mmol/L Final   • CO2 09/11/2022 27.8  22.0 - 29.0 mmol/L Final   • Calcium 09/11/2022 9.1  8.6 - 10.5 mg/dL Final   • Total Protein 09/11/2022 6.6  6.0 - 8.5 g/dL Final   • Albumin 09/11/2022 4.02  3.50 - 5.20 g/dL Final   • ALT (SGPT) 09/11/2022 73 (A) 1 - 41 U/L Final   • AST (SGOT) 09/11/2022 47 (A) 1 - 40 U/L Final   • Alkaline Phosphatase 09/11/2022 70  39 - 117 U/L Final   • Total Bilirubin 09/11/2022 0.2  0.0 - 1.2 mg/dL Final   • Globulin  09/11/2022 2.6  gm/dL Final   • A/G Ratio 09/11/2022 1.6  g/dL Final   • BUN/Creatinine Ratio 09/11/2022 22.2  7.0 - 25.0 Final   • Anion Gap 09/11/2022 8.2  5.0 - 15.0 mmol/L Final   • eGFR 09/11/2022 120.9  >60.0 mL/min/1.73 Final    National Kidney Foundation and American Society of Nephrology (ASN) Task Force recommended calculation based on the Chronic Kidney Disease Epidemiology Collaboration (CKD-EPI) equation refit without adjustment for race.   • Color, UA 09/11/2022 Yellow  Yellow, Straw Final   • Appearance, UA 09/11/2022 Cloudy (A) Clear Final   • pH, UA 09/11/2022 6.0  5.0 - 8.0 Final   • Specific Gravity, UA 09/11/2022 1.021  1.005 - 1.030 Final   • Glucose, UA 09/11/2022 Negative  Negative Final   • Ketones, UA 09/11/2022 Negative  Negative Final   • Bilirubin, UA 09/11/2022 Negative  Negative Final   • Blood, UA 09/11/2022 Negative  Negative Final   • Protein, UA 09/11/2022 Negative  Negative Final   • Leuk Esterase, UA 09/11/2022 Small (1+) (A) Negative Final   • Nitrite, UA 09/11/2022 Negative  Negative Final   • Urobilinogen, UA 09/11/2022 0.2 E.U./dL  0.2 - 1.0 E.U./dL Final   • Chlamydia DNA by PCR 09/11/2022 Not Detected  Not Detected Final   • Neisseria gonorrhoeae by PCR 09/11/2022 Not Detected  Not Detected Final   • C-Reactive Protein 09/11/2022 8.13 (A) 0.00 - 0.50 mg/dL Final   • COVID19 09/11/2022 Not Detected  Not Detected - Ref. Range Final   • Influenza A PCR 09/11/2022 Not Detected  Not Detected Final   • Influenza B PCR 09/11/2022 Not Detected  Not Detected Final   • WBC 09/11/2022 3.90  3.40 - 10.80 10*3/mm3 Final   • RBC 09/11/2022 4.40  4.14 - 5.80 10*6/mm3 Final   • Hemoglobin 09/11/2022 13.6  13.0 - 17.7 g/dL Final   • Hematocrit 09/11/2022 40.3  37.5 - 51.0 % Final   • MCV 09/11/2022 91.6  79.0 - 97.0 fL Final   • MCH 09/11/2022 30.9  26.6 - 33.0 pg Final   • MCHC 09/11/2022 33.7  31.5 - 35.7 g/dL Final   • RDW 09/11/2022 12.8  12.3 - 15.4 % Final   • RDW-SD 09/11/2022 42.9   37.0 - 54.0 fl Final   • MPV 09/11/2022 10.3  6.0 - 12.0 fL Final   • Platelets 09/11/2022 135 (A) 140 - 450 10*3/mm3 Final   • Neutrophil % 09/11/2022 32.2 (A) 42.7 - 76.0 % Final   • Lymphocyte % 09/11/2022 49.0 (A) 19.6 - 45.3 % Final   • Monocyte % 09/11/2022 8.5  5.0 - 12.0 % Final   • Eosinophil % 09/11/2022 9.0 (A) 0.3 - 6.2 % Final   • Basophil % 09/11/2022 1.0  0.0 - 1.5 % Final   • Immature Grans % 09/11/2022 0.3  0.0 - 0.5 % Final   • Neutrophils, Absolute 09/11/2022 1.26 (A) 1.70 - 7.00 10*3/mm3 Final   • Lymphocytes, Absolute 09/11/2022 1.91  0.70 - 3.10 10*3/mm3 Final   • Monocytes, Absolute 09/11/2022 0.33  0.10 - 0.90 10*3/mm3 Final   • Eosinophils, Absolute 09/11/2022 0.35  0.00 - 0.40 10*3/mm3 Final   • Basophils, Absolute 09/11/2022 0.04  0.00 - 0.20 10*3/mm3 Final   • Immature Grans, Absolute 09/11/2022 0.01  0.00 - 0.05 10*3/mm3 Final   • nRBC 09/11/2022 0.0  0.0 - 0.2 /100 WBC Final   • RBC Morphology 09/11/2022 Normal  Normal Final   • Platelet Morphology 09/11/2022 Normal  Normal Final   • RBC, UA 09/11/2022 0-2  None Seen, 0-2 /HPF Final   • WBC, UA 09/11/2022 6-12 (A) None Seen, 0-2 /HPF Final   • Bacteria, UA 09/11/2022 4+ (A) None Seen /HPF Final   • Squamous Epithelial Cells, UA 09/11/2022 0-2  None Seen, 0-2 /HPF Final   • Hyaline Casts, UA 09/11/2022 None Seen  None Seen /LPF Final   • Methodology 09/11/2022 Automated Microscopy   Final   • Urine Culture 09/11/2022 >100,000 CFU/mL Escherichia coli (A)  Final        Procedure: None  Procedures     I have reviewed and agree with the above PMH, PSH, FMH, social history, medications, allergies, and labs.     Assessment/Plan:   Problem List Items Addressed This Visit        Genitourinary and Reproductive     Bladder stone    Overview     Added automatically from request for surgery [id number]         Relevant Medications    oxybutynin (DITROPAN) 5 MG tablet    Other Relevant Orders    Case Request (Completed)    CBC (No Diff)    Basic  Metabolic Panel      Other Visit Diagnoses     Frequency of urination    -  Primary          Health Maintenance:   Health Maintenance Due   Topic Date Due   • COVID-19 Vaccine (1) Never done   • ANNUAL PHYSICAL  Never done   • Pneumococcal Vaccine 0-64 (1 - PCV) Never done   • TDAP/TD VACCINES (2 - Tdap) 10/24/2013        Smoking Counseling: Patient is a current everyday smoker and has a history of 0.5 packs/day for 5 years.  Counseling is given but patient is not ready to quit at this time.    Urine Incontinence: Patient reports that he is not currently experiencing any symptoms of urinary incontinence.    Patient was given instructions and counseling regarding his condition or for health maintenance advice. Please see specific information pulled into the AVS if appropriate.    Patient Education:   Bladder stone -I discussed with the patient causes of renal calculi which can include medications, food/food intake, or urinary retention.  I informed patient to increase fluid intake to 2 to 3 L of water per day. I advised the patient to urinate frequently or intermittent cath as needed.  Due to the patient's anatomy, he is unable to pass a straight catheter and requires coude catheters. I will talk with Jenni to arrange home delivery for coude catheters. He must self cath 4 times daily to help with urinary retention due to neurogenic bladder. I discussed with the patient the use of cystoscopy to help remove stones.  I have discussed with the patient in detail the risk and benefits of procedure.  Patient states that he would like to undergo procedure for stone removal. I have scheduled the patient to undergo surgery with Dr. Colunga on 9/21/2022.  Advised patient to return to clinic or go to nearest ER if symptoms worsen.  Informed patient to call with any questions or concerns.    Visit Diagnoses:    ICD-10-CM ICD-9-CM   1. Frequency of urination  R35.0 788.41   2. Bladder stone  N21.0 594.1       Meds Ordered During  Visit:  No orders of the defined types were placed in this encounter.      Follow Up Appointment: Cystoscopy litholapaxy with bladder stone extraction on 9/21 /2022 with Dr. Colunga  No follow-ups on file.      This document has been electronically signed by Arcadio Wells PA-C   September 15, 2022 16:04 EDT    Part of this note may be an electronic transcription/translation of spoken language to printed text using the Dragon Dictation System.

## 2022-10-10 ENCOUNTER — PRE-ADMISSION TESTING (OUTPATIENT)
Dept: PREADMISSION TESTING | Facility: HOSPITAL | Age: 32
End: 2022-10-10

## 2022-10-12 ENCOUNTER — ANESTHESIA (OUTPATIENT)
Dept: PERIOP | Facility: HOSPITAL | Age: 32
End: 2022-10-12

## 2022-10-12 ENCOUNTER — HOSPITAL ENCOUNTER (OUTPATIENT)
Facility: HOSPITAL | Age: 32
Setting detail: HOSPITAL OUTPATIENT SURGERY
Discharge: HOME | End: 2022-10-12
Attending: UROLOGY | Admitting: UROLOGY

## 2022-10-12 ENCOUNTER — ANESTHESIA EVENT (OUTPATIENT)
Dept: PERIOP | Facility: HOSPITAL | Age: 32
End: 2022-10-12

## 2022-10-12 ENCOUNTER — APPOINTMENT (OUTPATIENT)
Dept: GENERAL RADIOLOGY | Facility: HOSPITAL | Age: 32
End: 2022-10-12

## 2022-10-12 VITALS
HEIGHT: 67 IN | TEMPERATURE: 97.9 F | WEIGHT: 165 LBS | DIASTOLIC BLOOD PRESSURE: 72 MMHG | OXYGEN SATURATION: 97 % | HEART RATE: 65 BPM | SYSTOLIC BLOOD PRESSURE: 106 MMHG | BODY MASS INDEX: 25.9 KG/M2 | RESPIRATION RATE: 14 BRPM

## 2022-10-12 DIAGNOSIS — N21.0 BLADDER STONE: ICD-10-CM

## 2022-10-12 PROCEDURE — 25010000002 MIDAZOLAM PER 1 MG: Performed by: NURSE ANESTHETIST, CERTIFIED REGISTERED

## 2022-10-12 PROCEDURE — 25010000002 PROPOFOL 10 MG/ML EMULSION: Performed by: NURSE ANESTHETIST, CERTIFIED REGISTERED

## 2022-10-12 PROCEDURE — 25010000002 GENTAMICIN PER 80 MG

## 2022-10-12 PROCEDURE — 25010000002 FENTANYL CITRATE (PF) 50 MCG/ML SOLUTION: Performed by: NURSE ANESTHETIST, CERTIFIED REGISTERED

## 2022-10-12 PROCEDURE — 52317 REMOVE BLADDER STONE: CPT | Performed by: UROLOGY

## 2022-10-12 PROCEDURE — 82365 CALCULUS SPECTROSCOPY: CPT | Performed by: UROLOGY

## 2022-10-12 PROCEDURE — 25010000002 ONDANSETRON PER 1 MG: Performed by: NURSE ANESTHETIST, CERTIFIED REGISTERED

## 2022-10-12 RX ORDER — FENTANYL CITRATE 50 UG/ML
INJECTION, SOLUTION INTRAMUSCULAR; INTRAVENOUS AS NEEDED
Status: DISCONTINUED | OUTPATIENT
Start: 2022-10-12 | End: 2022-10-12 | Stop reason: SURG

## 2022-10-12 RX ORDER — FENTANYL CITRATE 50 UG/ML
50 INJECTION, SOLUTION INTRAMUSCULAR; INTRAVENOUS
Status: DISCONTINUED | OUTPATIENT
Start: 2022-10-12 | End: 2022-10-12 | Stop reason: HOSPADM

## 2022-10-12 RX ORDER — SODIUM CHLORIDE, SODIUM LACTATE, POTASSIUM CHLORIDE, CALCIUM CHLORIDE 600; 310; 30; 20 MG/100ML; MG/100ML; MG/100ML; MG/100ML
125 INJECTION, SOLUTION INTRAVENOUS ONCE
Status: COMPLETED | OUTPATIENT
Start: 2022-10-12 | End: 2022-10-12

## 2022-10-12 RX ORDER — BACLOFEN 20 MG/1
20 TABLET ORAL 3 TIMES DAILY
COMMUNITY

## 2022-10-12 RX ORDER — PROPOFOL 10 MG/ML
VIAL (ML) INTRAVENOUS AS NEEDED
Status: DISCONTINUED | OUTPATIENT
Start: 2022-10-12 | End: 2022-10-12 | Stop reason: SURG

## 2022-10-12 RX ORDER — ONDANSETRON 2 MG/ML
INJECTION INTRAMUSCULAR; INTRAVENOUS AS NEEDED
Status: DISCONTINUED | OUTPATIENT
Start: 2022-10-12 | End: 2022-10-12 | Stop reason: SURG

## 2022-10-12 RX ORDER — ONDANSETRON 2 MG/ML
4 INJECTION INTRAMUSCULAR; INTRAVENOUS AS NEEDED
Status: DISCONTINUED | OUTPATIENT
Start: 2022-10-12 | End: 2022-10-12 | Stop reason: HOSPADM

## 2022-10-12 RX ORDER — MIDAZOLAM HYDROCHLORIDE 1 MG/ML
INJECTION INTRAMUSCULAR; INTRAVENOUS AS NEEDED
Status: DISCONTINUED | OUTPATIENT
Start: 2022-10-12 | End: 2022-10-12 | Stop reason: SURG

## 2022-10-12 RX ORDER — SODIUM CHLORIDE 0.9 % (FLUSH) 0.9 %
10 SYRINGE (ML) INJECTION AS NEEDED
Status: DISCONTINUED | OUTPATIENT
Start: 2022-10-12 | End: 2022-10-12 | Stop reason: HOSPADM

## 2022-10-12 RX ORDER — MEPERIDINE HYDROCHLORIDE 25 MG/ML
12.5 INJECTION INTRAMUSCULAR; INTRAVENOUS; SUBCUTANEOUS
Status: DISCONTINUED | OUTPATIENT
Start: 2022-10-12 | End: 2022-10-12 | Stop reason: HOSPADM

## 2022-10-12 RX ORDER — SODIUM CHLORIDE, SODIUM LACTATE, POTASSIUM CHLORIDE, CALCIUM CHLORIDE 600; 310; 30; 20 MG/100ML; MG/100ML; MG/100ML; MG/100ML
100 INJECTION, SOLUTION INTRAVENOUS ONCE AS NEEDED
Status: DISCONTINUED | OUTPATIENT
Start: 2022-10-12 | End: 2022-10-12 | Stop reason: HOSPADM

## 2022-10-12 RX ORDER — OXYCODONE HYDROCHLORIDE AND ACETAMINOPHEN 5; 325 MG/1; MG/1
1 TABLET ORAL ONCE AS NEEDED
Status: DISCONTINUED | OUTPATIENT
Start: 2022-10-12 | End: 2022-10-12 | Stop reason: HOSPADM

## 2022-10-12 RX ORDER — HYDROCODONE BITARTRATE AND ACETAMINOPHEN 10; 325 MG/1; MG/1
1 TABLET ORAL EVERY 6 HOURS PRN
Qty: 12 TABLET | Refills: 0 | Status: SHIPPED | OUTPATIENT
Start: 2022-10-12

## 2022-10-12 RX ORDER — IPRATROPIUM BROMIDE AND ALBUTEROL SULFATE 2.5; .5 MG/3ML; MG/3ML
3 SOLUTION RESPIRATORY (INHALATION) ONCE AS NEEDED
Status: DISCONTINUED | OUTPATIENT
Start: 2022-10-12 | End: 2022-10-12 | Stop reason: HOSPADM

## 2022-10-12 RX ORDER — SODIUM CHLORIDE, SODIUM LACTATE, POTASSIUM CHLORIDE, CALCIUM CHLORIDE 600; 310; 30; 20 MG/100ML; MG/100ML; MG/100ML; MG/100ML
125 INJECTION, SOLUTION INTRAVENOUS ONCE
Status: DISCONTINUED | OUTPATIENT
Start: 2022-10-12 | End: 2022-10-12 | Stop reason: HOSPADM

## 2022-10-12 RX ORDER — FAMOTIDINE 10 MG/ML
INJECTION, SOLUTION INTRAVENOUS AS NEEDED
Status: DISCONTINUED | OUTPATIENT
Start: 2022-10-12 | End: 2022-10-12 | Stop reason: SURG

## 2022-10-12 RX ORDER — SODIUM CHLORIDE 0.9 % (FLUSH) 0.9 %
10 SYRINGE (ML) INJECTION EVERY 12 HOURS SCHEDULED
Status: DISCONTINUED | OUTPATIENT
Start: 2022-10-12 | End: 2022-10-12 | Stop reason: HOSPADM

## 2022-10-12 RX ORDER — MIDAZOLAM HYDROCHLORIDE 1 MG/ML
1 INJECTION INTRAMUSCULAR; INTRAVENOUS
Status: DISCONTINUED | OUTPATIENT
Start: 2022-10-12 | End: 2022-10-12 | Stop reason: HOSPADM

## 2022-10-12 RX ORDER — MAGNESIUM HYDROXIDE 1200 MG/15ML
LIQUID ORAL AS NEEDED
Status: DISCONTINUED | OUTPATIENT
Start: 2022-10-12 | End: 2022-10-12 | Stop reason: HOSPADM

## 2022-10-12 RX ORDER — KETOROLAC TROMETHAMINE 30 MG/ML
30 INJECTION, SOLUTION INTRAMUSCULAR; INTRAVENOUS EVERY 6 HOURS PRN
Status: DISCONTINUED | OUTPATIENT
Start: 2022-10-12 | End: 2022-10-12 | Stop reason: HOSPADM

## 2022-10-12 RX ORDER — GENTAMICIN SULFATE 80 MG/100ML
80 INJECTION, SOLUTION INTRAVENOUS ONCE
Status: COMPLETED | OUTPATIENT
Start: 2022-10-12 | End: 2022-10-12

## 2022-10-12 RX ADMIN — SODIUM CHLORIDE, POTASSIUM CHLORIDE, SODIUM LACTATE AND CALCIUM CHLORIDE: 600; 310; 30; 20 INJECTION, SOLUTION INTRAVENOUS at 10:54

## 2022-10-12 RX ADMIN — GENTAMICIN SULFATE 80 MG: 80 INJECTION, SOLUTION INTRAVENOUS at 10:54

## 2022-10-12 RX ADMIN — ONDANSETRON 4 MG: 2 INJECTION INTRAMUSCULAR; INTRAVENOUS at 10:54

## 2022-10-12 RX ADMIN — PROPOFOL 200 MG: 10 INJECTION, EMULSION INTRAVENOUS at 10:58

## 2022-10-12 RX ADMIN — FAMOTIDINE 20 MG: 10 INJECTION, SOLUTION INTRAVENOUS at 10:54

## 2022-10-12 RX ADMIN — MIDAZOLAM HYDROCHLORIDE 2 MG: 1 INJECTION, SOLUTION INTRAMUSCULAR; INTRAVENOUS at 10:54

## 2022-10-12 RX ADMIN — FENTANYL CITRATE 100 MCG: 50 INJECTION INTRAMUSCULAR; INTRAVENOUS at 10:54

## 2022-10-12 NOTE — ANESTHESIA PREPROCEDURE EVALUATION
Anesthesia Evaluation     Patient summary reviewed and Nursing notes reviewed                Airway   Mallampati: I  TM distance: >3 FB  Neck ROM: full  No difficulty expected and Possible difficult intubation  Dental - normal exam     Pulmonary - normal exam   (+) a smoker Current,   Cardiovascular - negative cardio ROS and normal exam        Neuro/Psych  (+) psychiatric history Anxiety,      ROS Comment: Quadroplegia  GI/Hepatic/Renal/Endo    (+)  GERD,      Musculoskeletal (-) negative ROS        ROS comment: Diagnoses  Diagnosis  Quadriplegia, C5-C7 incomplete (CMS Dx)    Neurogenic bowel    Neurogenic bladder    Neurogenic bladder, NOS    Spasticity    Abnormal involuntary movements    Acute cystitis without hematuria    At high risk for autonomic dysreflexia    Adjustment disorder with mixed anxiety and depressed mood    Constipation, unspecified constipation type        Abdominal  - normal exam    Bowel sounds: normal.   Substance History - negative use     OB/GYN negative ob/gyn ROS         Other                      Anesthesia Plan    ASA 3     general     intravenous induction     Anesthetic plan, risks, benefits, and alternatives have been provided, discussed and informed consent has been obtained with: patient.        CODE STATUS:

## 2022-10-12 NOTE — ANESTHESIA PROCEDURE NOTES
Airway  Urgency: elective    Airway not difficult    General Information and Staff    Patient location during procedure: OR  CRNA/CAA: Manny Harden CRNA    Indications and Patient Condition    Preoxygenated: yes  MILS not maintained throughout  Mask difficulty assessment: 0 - not attempted    Final Airway Details  Final airway type: supraglottic airway      Successful airway: unique  Size 4     Number of attempts at approach: 1  Assessment: lips, teeth, and gum same as pre-op and atraumatic intubation    Additional Comments  Atraumatic LMA placement, dentition unchanged.

## 2022-10-12 NOTE — ANESTHESIA POSTPROCEDURE EVALUATION
Patient: Phillip Mtata    Procedure Summary     Date: 10/12/22 Room / Location:  [facility] OR 06 /  [facility] OR    Anesthesia Start: 1054 Anesthesia Stop: 1121    Procedure: CYSTOSCOPY LITHOLAPAXY BLADDER STONE EXTRACTION Diagnosis:       Bladder stone      (Bladder stone [N21.0])    Surgeons: Demian Colunga MD Provider: Igor Thayer DO    Anesthesia Type: general ASA Status: 3          Anesthesia Type: general    Vitals  Vitals Value Taken Time   /74 10/12/22 1154   Temp 97.3 °F (36.3 °C) 10/12/22 1123   Pulse 65 10/12/22 1154   Resp 14 10/12/22 1154   SpO2 96 % 10/12/22 1154           Post Anesthesia Care and Evaluation    Patient location during evaluation: PACU  Patient participation: complete - patient participated  Level of consciousness: awake  Pain score: 0  Pain management: adequate    Airway patency: patent  Anesthetic complications: No anesthetic complications  PONV Status: none  Cardiovascular status: hemodynamically stable  Respiratory status: nasal cannula  Hydration status: acceptable

## 2022-10-19 LAB
COLOR STONE: NORMAL
COM MFR STONE: 20 %
COMPN STONE: NORMAL
HYDROXYAPATITE 24H ENGDIFF UR: 80 %
LABORATORY COMMENT REPORT: NORMAL
Lab: NORMAL
Lab: NORMAL
PHOTO: NORMAL
SIZE STONE: NORMAL MM
SPEC SOURCE SUBJ: NORMAL
STONE ANALYSIS-IMP: NORMAL
WT STONE: 43 MG

## 2022-11-23 ENCOUNTER — APPOINTMENT (OUTPATIENT)
Dept: CT IMAGING | Facility: HOSPITAL | Age: 32
End: 2022-11-23

## 2022-11-23 ENCOUNTER — HOSPITAL ENCOUNTER (EMERGENCY)
Facility: HOSPITAL | Age: 32
Discharge: HOME | End: 2022-11-24
Attending: STUDENT IN AN ORGANIZED HEALTH CARE EDUCATION/TRAINING PROGRAM | Admitting: STUDENT IN AN ORGANIZED HEALTH CARE EDUCATION/TRAINING PROGRAM

## 2022-11-23 VITALS
OXYGEN SATURATION: 98 % | BODY MASS INDEX: 25.9 KG/M2 | WEIGHT: 165 LBS | SYSTOLIC BLOOD PRESSURE: 102 MMHG | TEMPERATURE: 98 F | RESPIRATION RATE: 14 BRPM | HEART RATE: 63 BPM | HEIGHT: 67 IN | DIASTOLIC BLOOD PRESSURE: 74 MMHG

## 2022-11-23 DIAGNOSIS — R39.198 DIFFICULTY URINATING: Primary | ICD-10-CM

## 2022-11-23 LAB
ALBUMIN SERPL-MCNC: 4.01 G/DL (ref 3.5–5.2)
ALBUMIN/GLOB SERPL: 1.8 G/DL
ALP SERPL-CCNC: 74 U/L (ref 39–117)
ALT SERPL W P-5'-P-CCNC: 49 U/L (ref 1–41)
ANION GAP SERPL CALCULATED.3IONS-SCNC: 9 MMOL/L (ref 5–15)
AST SERPL-CCNC: 28 U/L (ref 1–40)
BASOPHILS # BLD AUTO: 0.06 10*3/MM3 (ref 0–0.2)
BASOPHILS NFR BLD AUTO: 0.9 % (ref 0–1.5)
BILIRUB SERPL-MCNC: 0.3 MG/DL (ref 0–1.2)
BUN SERPL-MCNC: 16 MG/DL (ref 6–20)
BUN/CREAT SERPL: 18.6 (ref 7–25)
CALCIUM SPEC-SCNC: 8.5 MG/DL (ref 8.6–10.5)
CHLORIDE SERPL-SCNC: 107 MMOL/L (ref 98–107)
CO2 SERPL-SCNC: 26 MMOL/L (ref 22–29)
CREAT SERPL-MCNC: 0.86 MG/DL (ref 0.76–1.27)
CRP SERPL-MCNC: <0.3 MG/DL (ref 0–0.5)
DEPRECATED RDW RBC AUTO: 44.6 FL (ref 37–54)
EGFRCR SERPLBLD CKD-EPI 2021: 118.7 ML/MIN/1.73
EOSINOPHIL # BLD AUTO: 0.33 10*3/MM3 (ref 0–0.4)
EOSINOPHIL NFR BLD AUTO: 5.2 % (ref 0.3–6.2)
ERYTHROCYTE [DISTWIDTH] IN BLOOD BY AUTOMATED COUNT: 13.1 % (ref 12.3–15.4)
GLOBULIN UR ELPH-MCNC: 2.3 GM/DL
GLUCOSE SERPL-MCNC: 104 MG/DL (ref 65–99)
HCT VFR BLD AUTO: 38.4 % (ref 37.5–51)
HGB BLD-MCNC: 13.1 G/DL (ref 13–17.7)
HOLD SPECIMEN: NORMAL
HOLD SPECIMEN: NORMAL
IMM GRANULOCYTES # BLD AUTO: 0.01 10*3/MM3 (ref 0–0.05)
IMM GRANULOCYTES NFR BLD AUTO: 0.2 % (ref 0–0.5)
LYMPHOCYTES # BLD AUTO: 3.37 10*3/MM3 (ref 0.7–3.1)
LYMPHOCYTES NFR BLD AUTO: 52.8 % (ref 19.6–45.3)
MCH RBC QN AUTO: 31.6 PG (ref 26.6–33)
MCHC RBC AUTO-ENTMCNC: 34.1 G/DL (ref 31.5–35.7)
MCV RBC AUTO: 92.5 FL (ref 79–97)
MONOCYTES # BLD AUTO: 0.44 10*3/MM3 (ref 0.1–0.9)
MONOCYTES NFR BLD AUTO: 6.9 % (ref 5–12)
NEUTROPHILS NFR BLD AUTO: 2.17 10*3/MM3 (ref 1.7–7)
NEUTROPHILS NFR BLD AUTO: 34 % (ref 42.7–76)
NRBC BLD AUTO-RTO: 0 /100 WBC (ref 0–0.2)
PLATELET # BLD AUTO: 173 10*3/MM3 (ref 140–450)
PMV BLD AUTO: 10 FL (ref 6–12)
POTASSIUM SERPL-SCNC: 4.2 MMOL/L (ref 3.5–5.2)
PROT SERPL-MCNC: 6.3 G/DL (ref 6–8.5)
RBC # BLD AUTO: 4.15 10*6/MM3 (ref 4.14–5.8)
SODIUM SERPL-SCNC: 142 MMOL/L (ref 136–145)
WBC NRBC COR # BLD: 6.38 10*3/MM3 (ref 3.4–10.8)
WHOLE BLOOD HOLD COAG: NORMAL
WHOLE BLOOD HOLD SPECIMEN: NORMAL

## 2022-11-23 PROCEDURE — 74176 CT ABD & PELVIS W/O CONTRAST: CPT

## 2022-11-23 PROCEDURE — 99284 EMERGENCY DEPT VISIT MOD MDM: CPT

## 2022-11-23 PROCEDURE — 86140 C-REACTIVE PROTEIN: CPT | Performed by: PHYSICIAN ASSISTANT

## 2022-11-23 PROCEDURE — 25010000002 KETOROLAC TROMETHAMINE PER 15 MG: Performed by: PHYSICIAN ASSISTANT

## 2022-11-23 PROCEDURE — 85025 COMPLETE CBC W/AUTO DIFF WBC: CPT | Performed by: PHYSICIAN ASSISTANT

## 2022-11-23 PROCEDURE — 51798 US URINE CAPACITY MEASURE: CPT

## 2022-11-23 PROCEDURE — 80053 COMPREHEN METABOLIC PANEL: CPT | Performed by: PHYSICIAN ASSISTANT

## 2022-11-23 PROCEDURE — 96374 THER/PROPH/DIAG INJ IV PUSH: CPT

## 2022-11-23 RX ORDER — SODIUM CHLORIDE 0.9 % (FLUSH) 0.9 %
10 SYRINGE (ML) INJECTION AS NEEDED
Status: DISCONTINUED | OUTPATIENT
Start: 2022-11-23 | End: 2022-11-24 | Stop reason: HOSPADM

## 2022-11-23 RX ORDER — KETOROLAC TROMETHAMINE 30 MG/ML
30 INJECTION, SOLUTION INTRAMUSCULAR; INTRAVENOUS ONCE
Status: COMPLETED | OUTPATIENT
Start: 2022-11-23 | End: 2022-11-23

## 2022-11-23 RX ADMIN — KETOROLAC TROMETHAMINE 30 MG: 30 INJECTION, SOLUTION INTRAMUSCULAR at 23:09

## 2022-11-24 LAB
BACTERIA UR QL AUTO: ABNORMAL /HPF
BILIRUB UR QL STRIP: NEGATIVE
C TRACH RRNA CVX QL NAA+PROBE: NOT DETECTED
CLARITY UR: CLEAR
COLOR UR: YELLOW
GLUCOSE UR STRIP-MCNC: NEGATIVE MG/DL
HGB UR QL STRIP.AUTO: ABNORMAL
HYALINE CASTS UR QL AUTO: ABNORMAL /LPF
KETONES UR QL STRIP: NEGATIVE
LEUKOCYTE ESTERASE UR QL STRIP.AUTO: ABNORMAL
N GONORRHOEA RRNA SPEC QL NAA+PROBE: NOT DETECTED
NITRITE UR QL STRIP: NEGATIVE
PH UR STRIP.AUTO: 6 [PH] (ref 5–8)
PROT UR QL STRIP: NEGATIVE
RBC # UR STRIP: ABNORMAL /HPF
REF LAB TEST METHOD: ABNORMAL
SP GR UR STRIP: 1.02 (ref 1–1.03)
SQUAMOUS #/AREA URNS HPF: ABNORMAL /HPF
UROBILINOGEN UR QL STRIP: ABNORMAL
WBC # UR STRIP: ABNORMAL /HPF

## 2022-11-24 PROCEDURE — 87491 CHLMYD TRACH DNA AMP PROBE: CPT | Performed by: PHYSICIAN ASSISTANT

## 2022-11-24 PROCEDURE — P9612 CATHETERIZE FOR URINE SPEC: HCPCS

## 2022-11-24 PROCEDURE — 87086 URINE CULTURE/COLONY COUNT: CPT | Performed by: PHYSICIAN ASSISTANT

## 2022-11-24 PROCEDURE — 87591 N.GONORRHOEAE DNA AMP PROB: CPT | Performed by: PHYSICIAN ASSISTANT

## 2022-11-24 PROCEDURE — 81001 URINALYSIS AUTO W/SCOPE: CPT | Performed by: PHYSICIAN ASSISTANT

## 2022-11-24 RX ORDER — TAMSULOSIN HYDROCHLORIDE 0.4 MG/1
1 CAPSULE ORAL DAILY
Qty: 30 CAPSULE | Refills: 0 | Status: SHIPPED | OUTPATIENT
Start: 2022-11-24

## 2022-11-25 LAB — BACTERIA SPEC AEROBE CULT: NO GROWTH

## 2022-12-01 NOTE — ED PROVIDER NOTES
Subjective   History of Present Illness  32-year-old paraplegic male presents to the ER with complaints of urinary retention.  Patient states he does self catheter there is at home.  Patient states that he has not been able to urinate.  Past medical history is positive for kidney stones.  Patient is currently under the care of Dr. Thompson.        Review of Systems   Constitutional: Negative.  Negative for fever.   HENT: Negative.    Respiratory: Negative.    Cardiovascular: Negative.  Negative for chest pain.   Gastrointestinal: Negative.  Negative for abdominal pain.   Endocrine: Negative.    Genitourinary: Positive for difficulty urinating. Negative for dysuria.   Musculoskeletal: Positive for back pain.   Skin: Negative.    Neurological: Negative.    Psychiatric/Behavioral: Negative.    All other systems reviewed and are negative.      Past Medical History:   Diagnosis Date   • Anxiety and depression    • Bladder stone    • Difficulty weaning from ventilator ([facility])    • GERD (gastroesophageal reflux disease)    • Kidney stones    • Muscle spasm of both lower legs    • Quadriplegia ([facility])    • Self-catheterizes urinary bladder    • Spinal cord injury at C1-C4 level ([facility])     C3-5.  march 2022       No Known Allergies    Past Surgical History:   Procedure Laterality Date   • ABDOMINAL SURGERY     • APPENDECTOMY     • BACK SURGERY      cervical instrumentation   • CYSTOSCOPY LITHOLAPAXY BLADDER STONE EXTRACTION N/A 10/12/2022    Procedure: CYSTOSCOPY LITHOLAPAXY BLADDER STONE EXTRACTION;  Surgeon: Demian Colunga MD;  Location: [facility];  Service: Urology;  Laterality: N/A;   • DENTAL PROCEDURE     • MYRINGOTOMY W/ TUBES         No family history on file.    Social History     Socioeconomic History   • Marital status: Single   Tobacco Use   • Smoking status: Every Day     Packs/day: 0.50     Years: 16.00     Pack years: 8.00     Types: Cigarettes     Start date: 2015   • Smokeless tobacco: Never   Vaping Use    • Vaping Use: Never used   Substance and Sexual Activity   • Alcohol use: Yes     Comment: history of   • Drug use: Never   • Sexual activity: Defer     Birth control/protection: None           Objective   Physical Exam  Vitals and nursing note reviewed.   Constitutional:       General: He is not in acute distress.     Appearance: He is well-developed. He is not diaphoretic.   HENT:      Head: Normocephalic and atraumatic.      Right Ear: External ear normal.      Left Ear: External ear normal.      Nose: Nose normal.   Eyes:      Conjunctiva/sclera: Conjunctivae normal.   Neck:      Vascular: No JVD.      Trachea: No tracheal deviation.   Cardiovascular:      Rate and Rhythm: Normal rate.      Heart sounds: No murmur heard.  Pulmonary:      Effort: Pulmonary effort is normal. No respiratory distress.      Breath sounds: No wheezing.   Abdominal:      Palpations: Abdomen is soft.      Tenderness: There is no abdominal tenderness.   Musculoskeletal:         General: No deformity.      Cervical back: Normal range of motion and neck supple.   Skin:     General: Skin is warm and dry.      Coloration: Skin is not pale.      Findings: No erythema or rash.   Neurological:      Mental Status: He is alert and oriented to person, place, and time.      Cranial Nerves: No cranial nerve deficit.   Psychiatric:         Behavior: Behavior normal.         Thought Content: Thought content normal.         Procedures           ED Course  ED Course as of 12/01/22 0612   Wed Nov 23, 2022   2358 CT abd pelvis rad interpreted:  1.  No acute CT abnormality in the abdomen or pelvis.  2.  No renal or ureteral calculus. No hydronephrosis.  3.  Large colonic stool burden likely reflective of constipation.  4.  T9 mild superior endplate deformity, new compared to prior study 9/11/2022. [RB]      ED Course User Index  [RB] Jah Glez II, PA                                           MDM  Number of Diagnoses or Management Options  Difficulty  urinating: new and requires workup     Amount and/or Complexity of Data Reviewed  Clinical lab tests: ordered and reviewed  Tests in the radiology section of CPT®: ordered and reviewed  Review and summarize past medical records: yes    Risk of Complications, Morbidity, and/or Mortality  Presenting problems: moderate  Diagnostic procedures: moderate  Management options: low    Patient Progress  Patient progress: stable      Final diagnoses:   Difficulty urinating       ED Disposition  ED Disposition     ED Disposition   Discharge    Condition   Stable    Comment   --             Demian Colunga MD  [address] [facility] [address]  [place] [id number] [phone]    Schedule an appointment as soon as possible for a visit            Medication List      New Prescriptions    tamsulosin 0.4 MG capsule 24 hr capsule  Commonly known as: FLOMAX  Take 1 capsule by mouth Daily.           Where to Get Your Medications      These medications were sent to [facility] [place] - [address] - [phone]  - [phone]   [address] [facility] [address]    Phone: [phone]   · tamsulosin 0.4 MG capsule 24 hr capsule          Jah Glez II, PA  12/01/22 0613

## 2023-01-16 ENCOUNTER — HOSPITAL ENCOUNTER (OUTPATIENT)
Dept: GENERAL RADIOLOGY | Facility: HOSPITAL | Age: 33
Discharge: HOME | End: 2023-01-16
Payer: MEDICAID

## 2023-01-16 DIAGNOSIS — N21.0 BLADDER STONE: ICD-10-CM

## 2023-01-17 ENCOUNTER — OFFICE VISIT (OUTPATIENT)
Dept: UROLOGY | Facility: CLINIC | Age: 33
End: 2023-01-17
Payer: MEDICAID

## 2023-01-17 ENCOUNTER — HOSPITAL ENCOUNTER (OUTPATIENT)
Dept: GENERAL RADIOLOGY | Facility: HOSPITAL | Age: 33
Discharge: HOME | End: 2023-01-17
Admitting: UROLOGY
Payer: MEDICAID

## 2023-01-17 VITALS — WEIGHT: 165 LBS | HEIGHT: 67 IN | BODY MASS INDEX: 25.9 KG/M2

## 2023-01-17 DIAGNOSIS — N31.9 NEUROGENIC BLADDER: ICD-10-CM

## 2023-01-17 DIAGNOSIS — N21.0 BLADDER STONE: Primary | ICD-10-CM

## 2023-01-17 PROCEDURE — 74018 RADEX ABDOMEN 1 VIEW: CPT

## 2023-01-17 PROCEDURE — 74018 RADEX ABDOMEN 1 VIEW: CPT | Performed by: RADIOLOGY

## 2023-01-17 PROCEDURE — 99213 OFFICE O/P EST LOW 20 MIN: CPT | Performed by: UROLOGY

## 2023-01-17 RX ORDER — TADALAFIL 20 MG/1
20 TABLET ORAL AS NEEDED
Qty: 20 TABLET | Refills: 1 | Status: SHIPPED | OUTPATIENT
Start: 2023-01-17

## 2023-01-17 NOTE — PROGRESS NOTES
Chief Complaint:      Chief Complaint   Patient presents with   • Surgery follow up       HPI:   32 y.o. male here with his caregiver does clean intermittent cath every 6 hours has residuals of 400 cc cc.  Uses the SCM-GL No. 2 8422.  KUB is negative other than large amount of stool we discussed bowel prep etc.  Stool program.  He has erectile dysfunction but he cannot maintain he can ejaculate I gave him Cialis to start with.  We will follow-up with him based on this he has a history of a bladder stone    Past Medical History:     Past Medical History:   Diagnosis Date   • Anxiety and depression    • Bladder stone    • Difficulty weaning from ventilator ([facility])    • GERD (gastroesophageal reflux disease)    • Kidney stones    • Muscle spasm of both lower legs    • Quadriplegia ([facility])    • Self-catheterizes urinary bladder    • Spinal cord injury at C1-C4 level ([facility])     C3-5.  march 2022       Current Meds:     Current Outpatient Medications   Medication Sig Dispense Refill   • ammonium lactate (AMLACTIN) 12 % cream      • baclofen (LIORESAL) 20 MG tablet Take 1 tablet by mouth 3 (Three) Times a Day.     • Diclofenac Sodium (VOLTAREN) 1 % gel gel      • ergocalciferol (ERGOCALCIFEROL) 1.25 MG (72413 UT) capsule Take 1 capsule by mouth 1 (One) Time Per Week.     • famotidine (PEPCID) 20 MG tablet Take 1 tablet by mouth 2 (Two) Times a Day. 14 tablet 0   • gabapentin (NEURONTIN) 800 MG tablet Take 1 tablet by mouth 3 (Three) Times a Day.     • HYDROcodone-acetaminophen (NORCO)  MG per tablet Take 1 tablet by mouth Every 6 (Six) Hours As Needed for Moderate Pain 12 tablet 0   • ibuprofen (ADVIL,MOTRIN) 600 MG tablet 1 tablet Every 8 (Eight) Hours As Needed.     • lidocaine (LIDODERM) 5 % Apply 2 patches topically to the appropriate area as directed.     • Melatonin Maximum Strength 5 MG tablet tablet 2 tablets Every Night.     • methocarbamol (ROBAXIN) 500 MG tablet Take 500 mg by mouth 3 (Three) Times a Day.      • tamsulosin (FLOMAX) 0.4 MG capsule 24 hr capsule Take 1 capsule by mouth Daily. 30 capsule 0     No current facility-administered medications for this visit.        Allergies:      No Known Allergies     Past Surgical History:     Past Surgical History:   Procedure Laterality Date   • ABDOMINAL SURGERY     • APPENDECTOMY     • BACK SURGERY      cervical instrumentation   • CYSTOSCOPY LITHOLAPAXY BLADDER STONE EXTRACTION N/A 10/12/2022    Procedure: CYSTOSCOPY LITHOLAPAXY BLADDER STONE EXTRACTION;  Surgeon: Demian Colunga MD;  Location: [facility];  Service: Urology;  Laterality: N/A;   • DENTAL PROCEDURE     • MYRINGOTOMY W/ TUBES         Social History:     Social History     Socioeconomic History   • Marital status: Single   Tobacco Use   • Smoking status: Every Day     Packs/day: 0.50     Years: 16.00     Pack years: 8.00     Types: Cigarettes     Start date: 2015   • Smokeless tobacco: Never   Vaping Use   • Vaping Use: Never used   Substance and Sexual Activity   • Alcohol use: Yes     Comment: history of   • Drug use: Never   • Sexual activity: Defer     Birth control/protection: None       Family History:     History reviewed. No pertinent family history.    Review of Systems:     Review of Systems   Constitutional: Negative.    HENT: Negative.    Eyes: Negative.    Respiratory: Negative.    Cardiovascular: Negative.    Gastrointestinal: Negative.    Endocrine: Negative.    Musculoskeletal: Negative.    Allergic/Immunologic: Negative.    Neurological: Negative.    Hematological: Negative.    Psychiatric/Behavioral: Negative.        Physical Exam:     Physical Exam  Vitals and nursing note reviewed.   Constitutional:       Appearance: He is well-developed.   HENT:      Head: Normocephalic and atraumatic.   Eyes:      Conjunctiva/sclera: Conjunctivae normal.      Pupils: Pupils are equal, round, and reactive to light.   Cardiovascular:      Rate and Rhythm: Normal rate and regular rhythm.      Heart  sounds: Normal heart sounds.   Pulmonary:      Effort: Pulmonary effort is normal.      Breath sounds: Normal breath sounds.   Abdominal:      General: Bowel sounds are normal.      Palpations: Abdomen is soft.   Musculoskeletal:         General: Normal range of motion.      Cervical back: Normal range of motion.   Skin:     General: Skin is warm and dry.   Neurological:      Mental Status: He is alert and oriented to person, place, and time.      Deep Tendon Reflexes: Reflexes are normal and symmetric.   Psychiatric:         Behavior: Behavior normal.         Thought Content: Thought content normal.         Judgment: Judgment normal.         I have reviewed the following portions of the patient's history: Allergies, current medications, past family history, past medical history, past social history, past surgical history, problem list, and ROS and confirm it is accurate.    Recent Image (CT and/or KUB):      CT Abdomen and Pelvis: No results found for this or any previous visit.       CT Stone Protocol: Results for orders placed during the hospital encounter of 11/23/22    CT Abdomen Pelvis Stone Protocol    Narrative  CT Abdomen Pelvis WO    INDICATION:  Flank pain, kidney stone suspected    TECHNIQUE:  CT of the abdomen and pelvis without IV contrast. Coronal and sagittal reconstructions were obtained.  Radiation dose reduction techniques included automated exposure control or exposure modulation based on body size. Count of known CT and cardiac nuc  med studies performed in previous 12 months: 1.    COMPARISON:  CT abdomen and pelvis 9/11/2022.    FINDINGS:    Lower chest: Unremarkable.  Liver: Unremarkable.  Gallbladder and bile ducts: Unremarkable.  Spleen: Unremarkable.  Pancreas: Unremarkable.  Adrenals: Unremarkable.  Kidneys and ureters: No renal or ureteral stones. No hydronephrosis.  Bowel: Large colonic stool volume. No bowel wall thickening or bowel dilatation.  Bladder: Unremarkable.  Reproductive  organs: Unremarkable.  Lymph nodes: Unremarkable.  Peritoneum: Unremarkable.  Vessels: Unremarkable.  Abdominal wall: Unremarkable.  Bones: Chronic posterior right-sided rib fractures. T9 mild superior endplate deformity, new compared to prior study.    Impression  1.  No acute CT abnormality in the abdomen or pelvis.  2.  No renal or ureteral calculus. No hydronephrosis.  3.  Large colonic stool burden likely reflective of constipation.  4.  T9 mild superior endplate deformity, new compared to prior study 9/11/2022.      Signer Name: Keegan Jorgensen MD  Signed: 11/23/2022 11:18 PM  Workstation Name: [id number]  [facility]       KUB: No results found for this or any previous visit.       Labs (past 3 months):      Admission on 11/23/2022, Discharged on 11/24/2022   Component Date Value Ref Range Status   • Glucose 11/23/2022 104 (H)  65 - 99 mg/dL Final   • BUN 11/23/2022 16  6 - 20 mg/dL Final   • Creatinine 11/23/2022 0.86  0.76 - 1.27 mg/dL Final   • Sodium 11/23/2022 142  136 - 145 mmol/L Final   • Potassium 11/23/2022 4.2  3.5 - 5.2 mmol/L Final    Slight hemolysis detected by analyzer. Results may be affected.   • Chloride 11/23/2022 107  98 - 107 mmol/L Final   • CO2 11/23/2022 26.0  22.0 - 29.0 mmol/L Final   • Calcium 11/23/2022 8.5 (L)  8.6 - 10.5 mg/dL Final   • Total Protein 11/23/2022 6.3  6.0 - 8.5 g/dL Final   • Albumin 11/23/2022 4.01  3.50 - 5.20 g/dL Final   • ALT (SGPT) 11/23/2022 49 (H)  1 - 41 U/L Final   • AST (SGOT) 11/23/2022 28  1 - 40 U/L Final   • Alkaline Phosphatase 11/23/2022 74  39 - 117 U/L Final   • Total Bilirubin 11/23/2022 0.3  0.0 - 1.2 mg/dL Final   • Globulin 11/23/2022 2.3  gm/dL Final   • A/G Ratio 11/23/2022 1.8  g/dL Final   • BUN/Creatinine Ratio 11/23/2022 18.6  7.0 - 25.0 Final   • Anion Gap 11/23/2022 9.0  5.0 - 15.0 mmol/L Final   • eGFR 11/23/2022 118.7  >60.0 mL/min/1.73 Final    National Kidney Foundation and American Society of Nephrology (ASN)  Task Force recommended calculation based on the Chronic Kidney Disease Epidemiology Collaboration (CKD-EPI) equation refit without adjustment for race.   • Color, UA 11/24/2022 Yellow  Yellow, Straw Final   • Appearance, UA 11/24/2022 Clear  Clear Final   • pH, UA 11/24/2022 6.0  5.0 - 8.0 Final   • Specific Gravity, UA 11/24/2022 1.022  1.005 - 1.030 Final   • Glucose, UA 11/24/2022 Negative  Negative Final   • Ketones, UA 11/24/2022 Negative  Negative Final   • Bilirubin, UA 11/24/2022 Negative  Negative Final   • Blood, UA 11/24/2022 Large (3+) (A)  Negative Final   • Protein, UA 11/24/2022 Negative  Negative Final   • Leuk Esterase, UA 11/24/2022 Small (1+) (A)  Negative Final   • Nitrite, UA 11/24/2022 Negative  Negative Final   • Urobilinogen, UA 11/24/2022 1.0 E.U./dL  0.2 - 1.0 E.U./dL Final   • C-Reactive Protein 11/23/2022 <0.30  0.00 - 0.50 mg/dL Final   • WBC 11/23/2022 6.38  3.40 - 10.80 10*3/mm3 Final   • RBC 11/23/2022 4.15  4.14 - 5.80 10*6/mm3 Final   • Hemoglobin 11/23/2022 13.1  13.0 - 17.7 g/dL Final   • Hematocrit 11/23/2022 38.4  37.5 - 51.0 % Final   • MCV 11/23/2022 92.5  79.0 - 97.0 fL Final   • MCH 11/23/2022 31.6  26.6 - 33.0 pg Final   • MCHC 11/23/2022 34.1  31.5 - 35.7 g/dL Final   • RDW 11/23/2022 13.1  12.3 - 15.4 % Final   • RDW-SD 11/23/2022 44.6  37.0 - 54.0 fl Final   • MPV 11/23/2022 10.0  6.0 - 12.0 fL Final   • Platelets 11/23/2022 173  140 - 450 10*3/mm3 Final   • Neutrophil % 11/23/2022 34.0 (L)  42.7 - 76.0 % Final   • Lymphocyte % 11/23/2022 52.8 (H)  19.6 - 45.3 % Final   • Monocyte % 11/23/2022 6.9  5.0 - 12.0 % Final   • Eosinophil % 11/23/2022 5.2  0.3 - 6.2 % Final   • Basophil % 11/23/2022 0.9  0.0 - 1.5 % Final   • Immature Grans % 11/23/2022 0.2  0.0 - 0.5 % Final   • Neutrophils, Absolute 11/23/2022 2.17  1.70 - 7.00 10*3/mm3 Final   • Lymphocytes, Absolute 11/23/2022 3.37 (H)  0.70 - 3.10 10*3/mm3 Final   • Monocytes, Absolute 11/23/2022 0.44  0.10 - 0.90 10*3/mm3  Final   • Eosinophils, Absolute 11/23/2022 0.33  0.00 - 0.40 10*3/mm3 Final   • Basophils, Absolute 11/23/2022 0.06  0.00 - 0.20 10*3/mm3 Final   • Immature Grans, Absolute 11/23/2022 0.01  0.00 - 0.05 10*3/mm3 Final   • nRBC 11/23/2022 0.0  0.0 - 0.2 /100 WBC Final   • Extra Tube 11/23/2022 Hold for add-ons.   Final    Auto resulted.   • Extra Tube 11/23/2022 hold for add-on   Final    Auto resulted   • Extra Tube 11/23/2022 Hold for add-ons.   Final    Auto resulted.   • Extra Tube 11/23/2022 Hold for add-ons.   Final    Auto resulted   • RBC, UA 11/24/2022 Too Numerous to Count (A)  None Seen, 0-2 /HPF Final   • WBC, UA 11/24/2022 6-12 (A)  None Seen, 0-2 /HPF Final   • Bacteria, UA 11/24/2022 None Seen  None Seen /HPF Final   • Squamous Epithelial Cells, UA 11/24/2022 0-2  None Seen, 0-2 /HPF Final   • Hyaline Casts, UA 11/24/2022 None Seen  None Seen /LPF Final   • Methodology 11/24/2022 Automated Microscopy   Final   • Urine Culture 11/24/2022 No growth   Final   • Chlamydia DNA by PCR 11/24/2022 Not Detected  Not Detected Final   • Neisseria gonorrhoeae by PCR 11/24/2022 Not Detected  Not Detected Final        Procedure:       Assessment/Plan:   Neurogenic bladder secondary to T4 injury.  Doing well for clean intermittent cath.  Erectile dysfunction-we discussed the anatomy and physiology of the penis and the endothelium.  We discussed the various forms of erectile dysfunction including peripheral vascular occlusive disease, postoperative, secondary to radiation treatments of the prostate, and arterial inflow.  We discussed the various treatment options available including oral medication and its various forms.  We discussed the use of both generic and non-generic Viagra.  We discussed Cialis and a longer half-life of 17 hours as well as the other 2 medications.  We discussed cost involved with this including the fact that the generic is much cheaper but is taken as multiple pills because they are 20 mg  dosages.  We did discuss the other alternatives including penile injections, vacuum erection devices and surgical intervention reserved for only the most severe cases.  We discussed the need for testosterone in about 20% of cases of erectile dysfunction.  We will start him on Cialis he gets reflexogenic erections if unsuccessful he will need Trimix              This document has been electronically signed by TYRON FOY MD January 17, 2023 08:35 EST    Dictated Utilizing Dragon Dictation: Part of this note may be an electronic transcription/translation of spoken language to printed text using the Dragon Dictation System.

## 2023-01-18 PROBLEM — N31.9 NEUROGENIC BLADDER: Status: ACTIVE | Noted: 2023-01-18

## 2023-10-12 NOTE — OP NOTE
Caller: Tonia Ritter    Relationship: Self    Best call back number: [phone]     Requested Prescriptions: AMOXICILLIN 875 MG     Pharmacy where request should be sent:  [facility]    Last office visit with prescribing clinician: 9/12/2023   Last telemedicine visit with prescribing clinician: Visit date not found   Next office visit with prescribing clinician: 11/6/2023     Additional details provided by patient: SHE STILL HAS A COUGH    Does the patient have less than a 3 day supply:  [x] Yes  [] No    Would you like a call back once the refill request has been completed: [x] Yes [] No    If the office needs to give you a call back, can they leave a voicemail: [x] Yes [] No    Chao Saleem Rep   10/12/23 16:31 EDT          CYSTOSCOPY LITHOLAPAXY BLADDER STONE EXTRACTION  Procedure Note    Phillip Matta  10/12/2022    Pre-op Diagnosis:   Bladder stone [N21.0]    Post-op Diagnosis:     Post-Op Diagnosis Codes:     * Bladder stone [N21.0]    Procedure/CPT® Codes:    32-year-old paraplegic with a bladder stone about 2 cm and a large floppy neurogenic bladder presents for cystoscopy litholapaxy.  Following an informed consent brought the procedure she had a completely normal urethra unenlarged prostate and a bladder stone which I was able to break up numerous pieces remove all the pieces he tolerated it well my recommendation will be intermittent clean cath at least twice a day he is not a candidate for an external condom catheter nor Crede voiding  Procedure(s):  CYSTOSCOPY LITHOLAPAXY BLADDER STONE EXTRACTION    Surgeon(s):  Demian Colunga MD    Anesthesia: see anesthesia record    Staff:   Circulator: Elizabeth Wtaers RN; Kody Tamayo RN  Scrub Person: Anabelle King LPN; Irlanda Martínez    Estimated Blood Loss: none  Urine Voided: * No values recorded between 10/12/2022 10:55 AM and 10/12/2022 11:21 AM *    Specimens:                ID Type Source Tests Collected by Time   1 (Not marked as sent) :  [facility] Urinary Bladder STONE ANALYSIS Demian Colunga MD 10/12/2022 1110         Drains: None    Findings: 2 cm bladder stone    Blood: N/A    Complications: None    Grafts and Implants: None    Demian Colunga MD     Date: 10/12/2022  Time: 12:03 EDT

## (undated) DEVICE — COR CYSTO: Brand: MEDLINE INDUSTRIES, INC.

## (undated) DEVICE — FLEXIVA  PULSE  AND  FLEXIVA  PULSE  TRACTIP  LASER  FIBERS  ARE  HIGH  POWER  SINGLE-USE FIBER: Brand: FLEXIVA PULSE

## (undated) DEVICE — EVAC BLDR ELLIK 1P/U

## (undated) DEVICE — Y-TYPE TUR/BLADDER IRRIGATION SET, REGULATING CLAMP

## (undated) DEVICE — DRAINBAG,ANTI-REFLUX TOWER,L/F,2000ML,LL: Brand: MEDLINE

## (undated) DEVICE — 100% SILICONE FOLEY CATHETER,5-10 ML, 2-WAY: Brand: DOVER

## (undated) DEVICE — GLV SURG PREMIERPRO MIC LTX PF SZ8 BRN